# Patient Record
Sex: FEMALE | Race: BLACK OR AFRICAN AMERICAN
[De-identification: names, ages, dates, MRNs, and addresses within clinical notes are randomized per-mention and may not be internally consistent; named-entity substitution may affect disease eponyms.]

---

## 2018-01-10 ENCOUNTER — HOSPITAL ENCOUNTER (OUTPATIENT)
Dept: HOSPITAL 92 - BICMAMMO | Age: 48
Discharge: HOME | End: 2018-01-10
Attending: NURSE PRACTITIONER
Payer: COMMERCIAL

## 2018-01-10 DIAGNOSIS — R92.1: ICD-10-CM

## 2018-01-10 DIAGNOSIS — Z12.31: Primary | ICD-10-CM

## 2018-01-10 PROCEDURE — 77067 SCR MAMMO BI INCL CAD: CPT

## 2018-01-10 PROCEDURE — 77063 BREAST TOMOSYNTHESIS BI: CPT

## 2018-01-22 ENCOUNTER — HOSPITAL ENCOUNTER (OUTPATIENT)
Dept: HOSPITAL 92 - BICMAMMO | Age: 48
Discharge: HOME | End: 2018-01-22
Attending: NURSE PRACTITIONER
Payer: COMMERCIAL

## 2018-01-22 DIAGNOSIS — R92.1: ICD-10-CM

## 2018-01-22 DIAGNOSIS — Z12.31: Primary | ICD-10-CM

## 2018-01-22 PROCEDURE — G0279 TOMOSYNTHESIS, MAMMO: HCPCS

## 2018-02-01 ENCOUNTER — HOSPITAL ENCOUNTER (OUTPATIENT)
Dept: HOSPITAL 92 - MAMMO | Age: 48
End: 2018-02-01
Attending: NURSE PRACTITIONER
Payer: COMMERCIAL

## 2018-02-01 DIAGNOSIS — Z88.5: ICD-10-CM

## 2018-02-01 DIAGNOSIS — N60.81: Primary | ICD-10-CM

## 2018-02-01 DIAGNOSIS — E11.9: ICD-10-CM

## 2018-02-01 DIAGNOSIS — Z98.890: ICD-10-CM

## 2018-02-01 DIAGNOSIS — J45.909: ICD-10-CM

## 2018-02-01 DIAGNOSIS — I10: ICD-10-CM

## 2018-02-01 DIAGNOSIS — E78.5: ICD-10-CM

## 2018-02-01 PROCEDURE — 88305 TISSUE EXAM BY PATHOLOGIST: CPT

## 2018-02-01 PROCEDURE — 0HBT3ZX EXCISION OF RIGHT BREAST, PERCUTANEOUS APPROACH, DIAGNOSTIC: ICD-10-PCS | Performed by: NURSE PRACTITIONER

## 2018-02-01 PROCEDURE — 19081 BX BREAST 1ST LESION STRTCTC: CPT

## 2018-02-01 PROCEDURE — 76098 X-RAY EXAM SURGICAL SPECIMEN: CPT

## 2018-02-01 NOTE — MMO
RIGHT BREAST STEREOTACTIC BIOPSY:

 

Date:  02/01/18

 

INDICATION:

Right breast calcifications with upper inner aspect of the right breast. 

 

COMPARISON:  

Prior diagnostic mammographic evaluation from [facility] dated 01/22/18. 

 

TECHNIQUE:  

Informed consent was obtained. Timeout was performed. The patient was placed prone on the stereotacti
c breast table. The right breast was positioned. The cluster of calcifications within the upper inner
 aspect of the right breast were localized. Site overlying the region was prepped and draped in the u
sual sterile fashion. Buffered 1% lidocaine was administered to the overlying subcutaneous tissues. A
 small incision was made. The stereotactic breast needle was guided into the skin. Pre-fire breast im
ages were performed, demonstrating appropriate position of the needle in relationship to the calcific
ations. The needle was then deployed. Post-fire images demonstrate the needle in the expected locatio
n of the cluster of calcifications within the upper inner right breast. Sampling was performed in a 3
60 degree revolution. Six separate samples were obtained. Following obtaining of the specimen, the sp
ecimen was sent to the mammographic suite to confirm the specimen containing representative calcifica
tions of the suspicious cluster. Upon confirmation that samples of the suspicious cluster were presen
t within the specimen, a clip was deployed. Pressure was held at the biopsy site until hemostasis was
 obtained. The patient was sent to the mammographic suite for a follow-up right breast mammogram to c
onfirm clip placement. 

 

IMPRESSION: 

BIRADS 4:  Suspicious Abnormality 

Status post stereotactic right breast biopsy. Awaiting pathology results. 

 

POS: [facility]

## 2018-02-01 NOTE — MMO
RIGHT BREAST DIAGNOSTIC MAMMOGRAM:

 

Date:  02/01/18 

 

INDICATION:

Post clip diagnostic mammograms. 

 

COMPARISON:  

Right breast diagnostic evaluation from [facility] dated 01/22/17. 

 

FINDINGS:

There is resection cavity seen within the upper inner aspect of the right breast corresponding to the
 region of suspicious cluster of calcifications identified on diagnostic evaluation dated 01/22/18. B
iopsy clip is seen within this region. 

 

IMPRESSION: 

BIRADS 4:  Suspicious Abnormality 

Status post stereotactic biopsy of suspicious cluster of calcifications within the upper inner aspect
 of the right breast. A majority of the calcifications have been sampled. 

 

 

 

POS: [facility]

## 2018-02-01 NOTE — MMO
RIGHT BREAST STEREOTACTIC BREAST BIOPSY SURGICAL SPECIMEN RADIOGRAPH;

 

Date:  02/01/18 

 

INDICATION: 

Suspicious calcifications within the right breast upper inner quadrant. 

 

FINDINGS:

The submitted specimens do demonstrate representative calcifications of the suspicious cluster seen i
n the upper inner aspect of the right breast on the diagnostic evaluation from [facility]
ter dated 01/22/18. 

 

IMPRESSION: 

BIRADS 4:  Suspicious Abnormality

Status post stereotactic breast biopsy for suspicious cluster of calcifications within the upper inne
r aspect of the right breast. The submitted specimens do contain representative calcifications of the
 suspicious cluster. 

 

 

 

POS: [facility]

## 2018-03-02 ENCOUNTER — HOSPITAL ENCOUNTER (OUTPATIENT)
Dept: HOSPITAL 92 - LABBT | Age: 48
Discharge: HOME | End: 2018-03-02
Attending: SURGERY
Payer: COMMERCIAL

## 2018-03-02 DIAGNOSIS — Z01.818: Primary | ICD-10-CM

## 2018-03-02 DIAGNOSIS — N60.91: ICD-10-CM

## 2018-03-02 LAB
ALBUMIN SERPL BCG-MCNC: 3.8 G/DL (ref 3.5–5)
ALP SERPL-CCNC: 62 U/L (ref 40–150)
ALT SERPL W P-5'-P-CCNC: 20 U/L (ref 8–55)
ANION GAP SERPL CALC-SCNC: 9 MMOL/L (ref 10–20)
AST SERPL-CCNC: 16 U/L (ref 5–34)
BASOPHILS # BLD AUTO: 0 THOU/UL (ref 0–0.2)
BASOPHILS NFR BLD AUTO: 0.4 % (ref 0–1)
BILIRUB SERPL-MCNC: 0.4 MG/DL (ref 0.2–1.2)
BUN SERPL-MCNC: 8 MG/DL (ref 7–18.7)
CALCIUM SERPL-MCNC: 9.2 MG/DL (ref 7.8–10.44)
CHLORIDE SERPL-SCNC: 107 MMOL/L (ref 98–107)
CO2 SERPL-SCNC: 26 MMOL/L (ref 22–29)
CREAT CL PREDICTED SERPL C-G-VRATE: 0 ML/MIN (ref 70–130)
EOSINOPHIL # BLD AUTO: 0.3 THOU/UL (ref 0–0.7)
EOSINOPHIL NFR BLD AUTO: 3.8 % (ref 0–10)
GLOBULIN SER CALC-MCNC: 3.3 G/DL (ref 2.4–3.5)
GLUCOSE SERPL-MCNC: 118 MG/DL (ref 70–105)
HGB BLD-MCNC: 11.1 G/DL (ref 12–16)
LYMPHOCYTES # BLD: 3.4 THOU/UL (ref 1.2–3.4)
LYMPHOCYTES NFR BLD AUTO: 45 % (ref 21–51)
MCH RBC QN AUTO: 22.6 PG (ref 27–31)
MCV RBC AUTO: 70 FL (ref 81–99)
MDIFF COMPLETE?: YES
MICROCYTES BLD QL SMEAR: (no result) (100X)
MONOCYTES # BLD AUTO: 0.5 THOU/UL (ref 0.11–0.59)
MONOCYTES NFR BLD AUTO: 7 % (ref 0–10)
NEUTROPHILS # BLD AUTO: 3.3 THOU/UL (ref 1.4–6.5)
NEUTROPHILS NFR BLD AUTO: 43.9 % (ref 42–75)
PLATELET # BLD AUTO: 267 THOU/UL (ref 130–400)
PLATELET BLD QL SMEAR: (no result)
POTASSIUM SERPL-SCNC: 3.7 MMOL/L (ref 3.5–5.1)
RBC # BLD AUTO: 4.91 MILL/UL (ref 4.2–5.4)
SODIUM SERPL-SCNC: 138 MMOL/L (ref 136–145)
WBC # BLD AUTO: 7.4 THOU/UL (ref 4.8–10.8)

## 2018-03-02 PROCEDURE — 93005 ELECTROCARDIOGRAM TRACING: CPT

## 2018-03-02 PROCEDURE — 85025 COMPLETE CBC W/AUTO DIFF WBC: CPT

## 2018-03-02 PROCEDURE — 93010 ELECTROCARDIOGRAM REPORT: CPT

## 2018-03-02 PROCEDURE — 80053 COMPREHEN METABOLIC PANEL: CPT

## 2018-03-07 ENCOUNTER — HOSPITAL ENCOUNTER (OUTPATIENT)
Dept: HOSPITAL 92 - SDC | Age: 48
Discharge: HOME | End: 2018-03-07
Attending: SURGERY
Payer: COMMERCIAL

## 2018-03-07 VITALS — BODY MASS INDEX: 31.6 KG/M2

## 2018-03-07 DIAGNOSIS — D24.1: ICD-10-CM

## 2018-03-07 DIAGNOSIS — N60.91: Primary | ICD-10-CM

## 2018-03-07 DIAGNOSIS — I10: ICD-10-CM

## 2018-03-07 DIAGNOSIS — J45.909: ICD-10-CM

## 2018-03-07 DIAGNOSIS — Z87.891: ICD-10-CM

## 2018-03-07 DIAGNOSIS — E66.3: ICD-10-CM

## 2018-03-07 DIAGNOSIS — Z79.899: ICD-10-CM

## 2018-03-07 DIAGNOSIS — Z88.5: ICD-10-CM

## 2018-03-07 DIAGNOSIS — G43.909: ICD-10-CM

## 2018-03-07 DIAGNOSIS — G40.909: ICD-10-CM

## 2018-03-07 DIAGNOSIS — E78.00: ICD-10-CM

## 2018-03-07 DIAGNOSIS — E78.5: ICD-10-CM

## 2018-03-07 DIAGNOSIS — Z79.82: ICD-10-CM

## 2018-03-07 PROCEDURE — 19281 PERQ DEVICE BREAST 1ST IMAG: CPT

## 2018-03-07 PROCEDURE — 88307 TISSUE EXAM BY PATHOLOGIST: CPT

## 2018-03-07 PROCEDURE — 76098 X-RAY EXAM SURGICAL SPECIMEN: CPT

## 2018-03-07 PROCEDURE — 96375 TX/PRO/DX INJ NEW DRUG ADDON: CPT

## 2018-03-07 PROCEDURE — 0HBT0ZX EXCISION OF RIGHT BREAST, OPEN APPROACH, DIAGNOSTIC: ICD-10-PCS | Performed by: SURGERY

## 2018-03-07 PROCEDURE — 96374 THER/PROPH/DIAG INJ IV PUSH: CPT

## 2018-03-07 NOTE — MMO
RIGHT BREAST NEEDLE LOCALIZATION:

 

HISTORY: 

Status post right breast stereotactic biopsy.  Needle localization is requested for surgical guidance
.

 

COMPARISON: 

2/1/18.

 

FINDINGS: 

Technically successful right breast needle localization.  A 7 cm Cougar needle wire adjacent to the bi
opsy clip.  Postprocedure images demonstrate appropriate positioning of the needle.  Wire has been ap
propriately deployed.

 

TECHNIQUE: 

Consent was obtained for a right breast needle localizaiton for surgical guidance.  The right breast 
was compressed in the mediolateral direction.  The skin was prepped and draped in sterile fashion.  1
% Lidocaine, buffered with sodium bicarbonate was used for local anesthesia.  Under mammographic guid
ance, a 7 cm Cougar needle was advanced such that the needle was adjacent to the clip.  Needle positio
n was confirmed in orhtopgonal planes.  Wire was deployed.  Post-deployment image was obtained.  The 
patient tolerated the procedure well.  No immediate or postprocedure complications.  Films were marke
d.  Needle and wire secured to the patient.

 

SPECIMEN RADIOGRAPH:

Three separate specimens were obtained.  The first specimen demonstrates a Cougar wire.  The clip is n
ot present.  The clip is not present on the remaining 2 specimens.

 

Findings of each specimen were conveyed to Dr. Herrera upon preentation of the specimen.

 

IMPRESSION: 

1.  Successful left breast needle localization.

 

2.  Wire is present in the specimen; however, the clip is not appreciated.

 

POS: [facility]

## 2018-03-07 NOTE — OP
PREOPERATIVE DIAGNOSIS:  Atypical ductal hyperplasia of the right breast.

 

SURGEON:  Delonte Herrera M.D.

 

PROCEDURE PERFORMED:  Right needle localization breast biopsy.

 

INDICATIONS:  This is a 48-year-old female who has had new cluster microcalcifications.  She underwen
t a core needle biopsy that came back atypical ductal hyperplasia.

 

FINDINGS:  The needle tip was placed far away from where the clip was and initial biopsy specimen rev
ealed the needle, but no clip.  Two other samples were obtained.  Never got the clip.  Attempted ultr
asound of the area could not find the clip.  Rather than doing a mastectomy, elected to abort at this
 time.

 

PROCEDURE IN DETAIL:  After informed consent was obtained, the patient was taken to the operating pradip
m.  She has undergone placement of a localization needle in mammography.  Her right breast was preppe
d and draped in usual fashion.  Local anesthesia infiltrated subcutaneously and deep after her breast
 was prepped and draped.  A circumareolar incision was performed.  The needle tract was palpable and 
a core of breast tissue was excised around the needle.  The skin was  from the breast tissue
.  The needle grasped and brought through the skin and then the rest of the needle tract was excised.
  The specimen was marked with anteromedial with the needle.  A blue suture superior and a white sutu
re lateral and sent to mammography.  Radiologist called back and said do not see the clip.  So, I exc
ised a larger portion of tissue all the way down to the pectoralis fascia along this needle tract and
 more medially marked it again with black anterior, blue superior, white medial.  This was again sent
.  No clip seen.  So, I took wider margins of that area trying to find the clip.  Also, I ultrasounde
d the breast.  I could not find the clip with intraoperative ultrasound.  I ultrasounded the tissue t
hat we removed did not see the clip rather than severely distorted her breast, we elected to abort th
e procedure.  Hemostasis achieved with electrocautery.  The cavity was irrigated with saline.  The salas
bcutaneous reapproximated with interrupted 3-0 Vicryl.  The skin closed with a running subcuticular 4
-0 Rapide.  Steri-Strips applied.  Sterile bandage applied.  The patient tolerated the procedure well
 and was transferred to recovery in good condition.  Sponge and needle count verified correct x2.

## 2018-08-30 ENCOUNTER — HOSPITAL ENCOUNTER (OUTPATIENT)
Dept: HOSPITAL 92 - ERS | Age: 48
Setting detail: OBSERVATION
LOS: 1 days | Discharge: HOME | End: 2018-08-31
Attending: HOSPITALIST | Admitting: HOSPITALIST
Payer: COMMERCIAL

## 2018-08-30 VITALS — BODY MASS INDEX: 30.6 KG/M2

## 2018-08-30 DIAGNOSIS — E78.00: ICD-10-CM

## 2018-08-30 DIAGNOSIS — Z88.5: ICD-10-CM

## 2018-08-30 DIAGNOSIS — E87.1: ICD-10-CM

## 2018-08-30 DIAGNOSIS — Z79.82: ICD-10-CM

## 2018-08-30 DIAGNOSIS — J45.20: ICD-10-CM

## 2018-08-30 DIAGNOSIS — F41.9: ICD-10-CM

## 2018-08-30 DIAGNOSIS — D50.9: ICD-10-CM

## 2018-08-30 DIAGNOSIS — E78.5: ICD-10-CM

## 2018-08-30 DIAGNOSIS — Z79.899: ICD-10-CM

## 2018-08-30 DIAGNOSIS — R03.0: ICD-10-CM

## 2018-08-30 DIAGNOSIS — E66.9: ICD-10-CM

## 2018-08-30 DIAGNOSIS — R73.03: ICD-10-CM

## 2018-08-30 DIAGNOSIS — Z77.22: ICD-10-CM

## 2018-08-30 DIAGNOSIS — R55: Primary | ICD-10-CM

## 2018-08-30 LAB
ALBUMIN SERPL BCG-MCNC: 4 G/DL (ref 3.5–5)
ALP SERPL-CCNC: 70 U/L (ref 40–150)
ALT SERPL W P-5'-P-CCNC: 15 U/L (ref 8–55)
ANION GAP SERPL CALC-SCNC: 12 MMOL/L (ref 10–20)
AST SERPL-CCNC: 15 U/L (ref 5–34)
BILIRUB SERPL-MCNC: 0.7 MG/DL (ref 0.2–1.2)
BUN SERPL-MCNC: 10 MG/DL (ref 7–18.7)
CALCIUM SERPL-MCNC: 9 MG/DL (ref 7.8–10.44)
CHLORIDE SERPL-SCNC: 105 MMOL/L (ref 98–107)
CK MB SERPL-MCNC: 1.6 NG/ML (ref 0–6.6)
CK SERPL-CCNC: 195 U/L (ref 29–168)
CO2 SERPL-SCNC: 22 MMOL/L (ref 22–29)
CREAT CL PREDICTED SERPL C-G-VRATE: 0 ML/MIN (ref 70–130)
CRYSTAL-AUWI FLAG: 0 (ref 0–15)
GLOBULIN SER CALC-MCNC: 3.7 G/DL (ref 2.4–3.5)
GLUCOSE SERPL-MCNC: 95 MG/DL (ref 70–105)
HEV IGM SER QL: 0.1 (ref 0–7.99)
HGB BLD-MCNC: 11.4 G/DL (ref 12–16)
HYALINE CASTS #/AREA URNS LPF: (no result) LPF
LIPASE SERPL-CCNC: 31 U/L (ref 8–78)
MCH RBC QN AUTO: 22.3 PG (ref 27–31)
MCV RBC AUTO: 68 FL (ref 78–98)
MDIFF COMPLETE?: YES
MICROCYTES BLD QL SMEAR: (no result) (100X)
PATHC CAST-AUWI FLAG: 0 (ref 0–2.49)
PLATELET # BLD AUTO: 252 THOU/UL (ref 130–400)
POTASSIUM SERPL-SCNC: 3.9 MMOL/L (ref 3.5–5.1)
PREGS CONTROL BACKGROUND?: (no result)
PREGS CONTROL BAR APPEAR?: YES
RBC # BLD AUTO: 5.09 MILL/UL (ref 4.2–5.4)
RBC UR QL AUTO: (no result) HPF (ref 0–3)
SODIUM SERPL-SCNC: 135 MMOL/L (ref 136–145)
SP GR UR STRIP: 1.01 (ref 1–1.04)
SPERM-AUWI FLAG: 0 (ref 0–9.9)
TROPONIN I SERPL DL<=0.01 NG/ML-MCNC: (no result) NG/ML (ref ?–0.03)
WBC # BLD AUTO: 8.8 THOU/UL (ref 4.8–10.8)
WBC UR QL AUTO: (no result) HPF (ref 0–3)
YEAST-AUWI FLAG: 0 (ref 0–25)

## 2018-08-30 PROCEDURE — 84484 ASSAY OF TROPONIN QUANT: CPT

## 2018-08-30 PROCEDURE — 96375 TX/PRO/DX INJ NEW DRUG ADDON: CPT

## 2018-08-30 PROCEDURE — 85025 COMPLETE CBC W/AUTO DIFF WBC: CPT

## 2018-08-30 PROCEDURE — 71045 X-RAY EXAM CHEST 1 VIEW: CPT

## 2018-08-30 PROCEDURE — 83880 ASSAY OF NATRIURETIC PEPTIDE: CPT

## 2018-08-30 PROCEDURE — 96372 THER/PROPH/DIAG INJ SC/IM: CPT

## 2018-08-30 PROCEDURE — 36415 COLL VENOUS BLD VENIPUNCTURE: CPT

## 2018-08-30 PROCEDURE — 82553 CREATINE MB FRACTION: CPT

## 2018-08-30 PROCEDURE — 81003 URINALYSIS AUTO W/O SCOPE: CPT

## 2018-08-30 PROCEDURE — 81015 MICROSCOPIC EXAM OF URINE: CPT

## 2018-08-30 PROCEDURE — 93880 EXTRACRANIAL BILAT STUDY: CPT

## 2018-08-30 PROCEDURE — 70450 CT HEAD/BRAIN W/O DYE: CPT

## 2018-08-30 PROCEDURE — G0378 HOSPITAL OBSERVATION PER HR: HCPCS

## 2018-08-30 PROCEDURE — 83690 ASSAY OF LIPASE: CPT

## 2018-08-30 PROCEDURE — 96365 THER/PROPH/DIAG IV INF INIT: CPT

## 2018-08-30 PROCEDURE — 80048 BASIC METABOLIC PNL TOTAL CA: CPT

## 2018-08-30 PROCEDURE — 84703 CHORIONIC GONADOTROPIN ASSAY: CPT

## 2018-08-30 PROCEDURE — 93306 TTE W/DOPPLER COMPLETE: CPT

## 2018-08-30 PROCEDURE — 93005 ELECTROCARDIOGRAM TRACING: CPT

## 2018-08-30 PROCEDURE — 80053 COMPREHEN METABOLIC PANEL: CPT

## 2018-08-30 NOTE — RAD
CHEST ONE VIEW:

8/30/18

 

HISTORY: 

Chest pain. 

 

COMPARISON:  

9/28/17.

 

FINDINGS:  

The cardiac silhouette is magnified by projection. Pulmonary vasculature is slightly engorged. Medias
tinum is midline. No lobar consolidation or evidence of pneumothorax. Cardiac monitor leads overlie t
he chest. 

 

IMPRESSION:  

Mild pulmonary vascular congestion. 

 

POS: [facility]

## 2018-08-30 NOTE — CT
CT HEAD NONCONTRAST:

8/30/18

 

HISTORY: 

Seizure.

 

COMPARISON:  

8/27/15.

 

FINDINGS:  

There is no evidence of acute intracranial hemorrhage or infarct. Ventricles appear normal in size, s
hape and position. There is no mass effect or shift of midline structures. 

 

IMPRESSION:  

No acute intracranial abnormalities are demonstrated. 

 

POS: [facility]

## 2018-08-31 VITALS — SYSTOLIC BLOOD PRESSURE: 101 MMHG | DIASTOLIC BLOOD PRESSURE: 51 MMHG | TEMPERATURE: 98.9 F

## 2018-08-31 LAB
ANION GAP SERPL CALC-SCNC: 10 MMOL/L (ref 10–20)
BASOPHILS # BLD AUTO: 0 THOU/UL (ref 0–0.2)
BASOPHILS NFR BLD AUTO: 0.1 % (ref 0–1)
BUN SERPL-MCNC: 8 MG/DL (ref 7–18.7)
CALCIUM SERPL-MCNC: 8.4 MG/DL (ref 7.8–10.44)
CHLORIDE SERPL-SCNC: 110 MMOL/L (ref 98–107)
CO2 SERPL-SCNC: 20 MMOL/L (ref 22–29)
CREAT CL PREDICTED SERPL C-G-VRATE: 115 ML/MIN (ref 70–130)
EOSINOPHIL # BLD AUTO: 0.3 THOU/UL (ref 0–0.7)
EOSINOPHIL NFR BLD AUTO: 4.9 % (ref 0–10)
GLUCOSE SERPL-MCNC: 101 MG/DL (ref 70–105)
HGB BLD-MCNC: 10.3 G/DL (ref 12–16)
LYMPHOCYTES # BLD: 1.3 THOU/UL (ref 1.2–3.4)
LYMPHOCYTES NFR BLD AUTO: 18.2 % (ref 21–51)
MCH RBC QN AUTO: 22.5 PG (ref 27–31)
MCV RBC AUTO: 68.4 FL (ref 78–98)
MONOCYTES # BLD AUTO: 0.5 THOU/UL (ref 0.11–0.59)
MONOCYTES NFR BLD AUTO: 7.6 % (ref 0–10)
NEUTROPHILS # BLD AUTO: 5 THOU/UL (ref 1.4–6.5)
NEUTROPHILS NFR BLD AUTO: 69.2 % (ref 42–75)
PLATELET # BLD AUTO: 228 THOU/UL (ref 130–400)
POTASSIUM SERPL-SCNC: 3.9 MMOL/L (ref 3.5–5.1)
RBC # BLD AUTO: 4.57 MILL/UL (ref 4.2–5.4)
SODIUM SERPL-SCNC: 136 MMOL/L (ref 136–145)
TROPONIN I SERPL DL<=0.01 NG/ML-MCNC: (no result) NG/ML (ref ?–0.03)
TROPONIN I SERPL DL<=0.01 NG/ML-MCNC: (no result) NG/ML (ref ?–0.03)
WBC # BLD AUTO: 7.2 THOU/UL (ref 4.8–10.8)

## 2018-08-31 NOTE — HP
PRIMARY CARE PHYSICIAN:  Kailey Montaño NP

 

CODE STATUS:  FULL CODE.

 

TIME OF EVALUATION:  11:30 p.m.

 

CHIEF COMPLAINT:  Syncope.

 

HISTORY OF PRESENT ILLNESS:  This is a 48-year-old female patient with past medical history of high c
holesterol; borderline hypertension, has been off medication since her blood pressure medications wer
e dropping her blood pressure too much; also borderline diabetes, no specific treatment.  She came to
 the hospital after having an episode of syncope when she was starting to drive in her driveway.  The
 symptom started suddenly, lasted for a few seconds.  No residual neurological damage was seen after 
the patient woke up.  The patient's mentation was proper after she woke up.  No clear triggers, impro
keisha by itself.  Symptoms were not severe.  Witness reported that she had no shaking and no seizure-li
ke activity.

 

REVIEW OF SYSTEMS:  Constitutional:  No fever, chills, or generalized weakness.  Respiratory:  No cou
gh, sputum production, or shortness of breath.  Cardiovascular:  No chest pain, palpitation, shortnes
s of breath.  Gastrointestinal:  No nausea.  No vomiting, diarrhea, or abdominal pain.  Central nervo
us system:  The patient had a syncopal episode with no prodromic symptoms.  No headache or feeling li
ghtheaded.  Genitourinary:  No burning on urination.  Extremities:  No leg swelling.  All other syste
ms were reviewed and negative except for the findings mentioned above.

 

PAST MEDICAL HISTORY:  As mentioned in HPI.

 

PAST SURGICAL HISTORY:  The patient had a negative cardiac catheterization in 2011.

 

PAST SURGICAL HISTORY:  Tubal ligation, abnormal cells removed from right breast.  She reported that 
she had no cancer.

 

PSYCHIATRIC HISTORY:  Anxiety.

 

SOCIAL HISTORY:  The patient lives with family.  Secondhand smoke from her .  Drinks socially 
occasionally.  No drug use.  No smoking history.  The patient drinks every day.

 

ALLERGIES:  CODEINE.

 

MEDICATIONS:  Aspirin, atorvastatin.

 

PHYSICAL EXAMINATION:

VITAL SIGNS:  On presentation, blood pressure 149/79 with heart rate 71, respiratory rate was 18, tem
perature 99.1.  Pain was 8/10 with a headache.

GENERAL APPEARANCE:  The patient was met at bedside.  She was feeling comfortable, no acute distress.


HEENT:  Eyes, normal conjunctivae.  Moist oral mucosa.  Anicteric.

NECK:  No JVD.

RESPIRATORY:  Bilateral air entry.  No rales, no wheezing.  Symmetric expansion.

CARDIOVASCULAR:  Normal rate, regular rhythm.  No murmurs, no gallop, no edema.

ABDOMEN:  Soft.  Normal bowel sounds.

MUSCULOSKELETAL:  Baseline range of motion and strength.  No tenderness.

SKIN:  Warm and intact.  No pallor, no rash, no redness.  Peripheral pulses are present.  Capillary r
efill seems to be intact.

NEUROLOGIC:  Baseline sensorium.  No evidence of any new focal weakness.  Baseline speech.  Cranial n
erves seem to be intact.

PSYCHIATRIC:  The patient is in good mood.  No anxiety.  Oriented with optimal judgment.

 

EKG was reviewed.  The patient has normal sinus rhythm at a rate of 71 with , QRS 78, QT correc
keyona 445.  Chest x-ray:  Mild pulmonary vascular congestion.  Brain CT:  No acute intracranial abnorma
lities are demonstrated.

 

LABORATORY DATA:  Reviewed.  White count 8.8, hemoglobin 11.4, platelet count 252.  Chemistry:  Sodiu
m 135, potassium 3.9, chloride 105, carbon dioxide 22, anion gap 12, BUN 10, creatinine 0.79, GFR gre
ater than 90, glucose 95, calcium 9, total bilirubin 0.7, AST 15, ALT 15, alkaline phosphatase 70, CK
 195.  Troponin is less than 0.010.  Beta-natriuretic peptide 11.8.  Serum total protein 7.7, albumin
 4, globulin 3.7, albumin globulin ratio is 1.1, lipase 31.  Serum pregnancy test was negative.  UA w
as negative except for some hematuria seen in urine.

 

ASSESSMENT AND PLAN:  The patient will be placed in the hospital with following medical problems:

1.  Syncope, unclear etiology, by history seems to be cardiac related.  No neuro findings have been r
eported.  We will do echo.  We will monitor on tele.  We will do carotid Doppler.  Further management
 after workup results.

2.  Hyponatremia, sodium 135.  This is mild, no need for any acute intervention.  We will monitor.  W
e will adjust treatment as needed.

3.  Microcytic anemia.  Hemoglobin 11.4.  This is chronic.  This can be addressed as outpatient.  The
re is some possible hematuria on the urine.  The patient has no urinary symptoms.  It can be monitore
d.  Kidney function is normal.  CK is only 195.

4.  Deep venous thrombosis prophylaxis.

5.  Possible pulmonary congestion seen on chest x-ray.  We will be doing echo in the morning.  We ananth
l evaluate cardiac function by echo result.

6.  Hyperlipidemia.  Reconcile home medications.  Low-cholesterol diet is advised.

## 2018-08-31 NOTE — ULT
BILATERAL CAROTID DUPLEX ULTRASOUND:

 

DATE:  08/31/18 

 

HISTORY:  

Syncope. 

 

TECHNIQUE:  

Gray scale ultrasound with color flow and spectral Doppler imaging of the extracranial carotid artery
 systems performed. 

 

FINDINGS:

 

No plaque formation or significant intimal wall thickening is seen on either side. 

 

The peak systolic velocity in the right ICA measures 109 cm/second with an end-diastolic velocity of 
29 cm/second and a systolic ratio of 1.10. 

 

The peak systolic velocity in the left ICA measures 111 cm/second with an end-diastolic velocity of 3
3 cm/second and a systolic ratio of 0.90. 

 

Flow in both vertebral arteries remains antegrade. 

 

IMPRESSION: 

No evidence of hemodynamically significant stenosis.  

 

POS: [facility]

## 2018-08-31 NOTE — PDOC.PN
- Subjective


Encounter Start Date: 08/31/18


Encounter Start Time: 08:30


-: old records requested/rev





Patient seen and examined. No new complaints. No overnight events





- Objective


Resuscitation Status: 


 











Resuscitation Status           FULL:Full Resuscitation














MAR Reviewed: Yes


Vital Signs & Weight: 


 Vital Signs (12 hours)











  Temp Pulse Resp BP BP BP BP


 


 08/31/18 07:33  98.5 F  68  16  112/72   


 


 08/31/18 03:28  99 F  69  14  98/54 L   


 


 08/31/18 01:40  99.3 F  69  14    


 


 08/31/18 01:35      142/76 H  136/79  127/75


 


 08/31/18 00:50  99.3 F  69  14  123/72   














  Pulse Ox


 


 08/31/18 07:33  94 L


 


 08/31/18 03:28  95


 


 08/31/18 01:40 


 


 08/31/18 01:35 


 


 08/31/18 00:50  97








 Weight











Weight                         167 lb 8 oz














I&O: 


 











 08/30/18 08/31/18 09/01/18





 06:59 06:59 06:59


 


Intake Total  50 


 


Output Total  600 400


 


Balance  -550 -400











Result Diagrams: 


 08/31/18 03:23





 08/31/18 03:23


Radiology Reviewed by me: Yes


EKG Reviewed by me: Yes





Phys Exam





- Physical Examination


Constitutional: NAD


HEENT: PERRLA, moist MMs, sclera anicteric


Neck: no JVD, supple


Respiratory: no wheezing, no rales, no rhonchi


Cardiovascular: RRR, no significant murmur, no rub


Gastrointestinal: soft, non-tender, no distention, positive bowel sounds


Musculoskeletal: no edema, pulses present


Neurological: non-focal, normal sensation, moves all 4 limbs


Psychiatric: normal affect, A&O x 3


Skin: no rash, normal turgor





Dx/Plan


(1) Syncope


Code(s): R55 - SYNCOPE AND COLLAPSE   Status: Acute   





(2) Asthma


Code(s): J45.909 - UNSPECIFIED ASTHMA, UNCOMPLICATED   Status: Chronic   





(3) Dyslipidemia


Code(s): E78.5 - HYPERLIPIDEMIA, UNSPECIFIED   Status: Chronic   





(4) Microcytic anemia


Code(s): D50.9 - IRON DEFICIENCY ANEMIA, UNSPECIFIED   Status: Chronic   





(5) Obesity (BMI 30.0-34.9)


Code(s): E66.9 - OBESITY, UNSPECIFIED   Status: Chronic   





- Plan


cont current plan of care, plan discussed w/ family





* medication reviewed as below


* symptomatic treatment


* see my discharge sera.








Review of Systems





- Review of Systems


ENT: negative: Ear Pain, Ear Discharge, Nose Pain, Nose Discharge, Nose 

Congestion, Mouth Pain, Mouth Swelling, Throat Pain, Throat Swelling, Other


Respiratory: negative: Cough, Dry, Shortness of Breath, Hemoptysis, SOB with 

Excertion, Pleuritic Pain, Sputum, Wheezing


Cardiovascular: negative: chest pain, palpitations, orthopnea, paroxysmal 

nocturnal dyspnea, edema, light headedness, other


Gastrointestinal: negative: Nausea, Vomiting, Abdominal Pain, Diarrhea, 

Constipation, Melena, Hematochezia, Other


Genitourinary: negative: Dysuria, Frequency, Incontinence, Hematuria, Retention

, Other


Musculoskeletal: negative: Neck Pain, Shoulder Pain, Arm Pain, Back Pain, Hand 

Pain, Leg Pain, Foot Pain, Other


Skin: negative: Rash, Lesions, Edgar, Bruising, Other





- Medications/Allergies


Allergies/Adverse Reactions: 


 Allergies











Allergy/AdvReac Type Severity Reaction Status Date / Time


 


codeine Allergy   Verified 08/31/18 01:13











Medications: 


 Current Medications





Acetaminophen (Tylenol)  650 mg PO Q4H PRN


   PRN Reason: Headache/Fever or Pain


   Last Admin: 08/31/18 09:05 Dose:  650 mg


Al Hydroxide/Mg Hydroxide (Maalox)  15 ml PO Q4H PRN


   PRN Reason: Heartburn  or Indigestion


Artificial Tears (Tears Renewed 15ml Bottle)  0 drop EA EYE PRN PRN


   PRN Reason: Dry Eyes


Aspirin (Aspirin Chewable)  81 mg PO [facility]


   Last Admin: 08/31/18 09:05 Dose:  81 mg


Atorvastatin Calcium (Lipitor)  20 mg PO [facility]


Famotidine (Pepcid)  20 mg PO BIDPRN PRN


   PRN Reason: Heartburn  or Indigestion


Guaifenesin (Robitussin Sf)  200 mg PO Q4H PRN


   PRN Reason: Cough


Hydralazine HCl (Apresoline)  10 mg SLOW IVP Q4H PRN


   PRN Reason: Systolic BP > 180


Loperamide HCl (Imodium)  2 mg PO PRN PRN


   PRN Reason: Diarrhea/Loose Stools


Loratadine (Claritin)  10 mg PO DAILYPRN PRN


   PRN Reason: Sinus Symptoms


Magnesium Hydroxide (Milk Of Magnesium)  30 ml PO DAILYPRN PRN


   PRN Reason: Constipation


Mineral Oil/White Petrolatum (Eucerin Cream)  0 gm TOP BIDPRN PRN


   PRN Reason: Dry Skin


Ondansetron HCl (Zofran)  4 mg IVP Q6H PRN


   PRN Reason: Nausea/Vomiting


Phenol (Chloraseptic Spray 180 Ml Bot)  0 ml PO PRN PRN


   PRN Reason: Sore Throat


Senna (Senokot)  2 tab PO HSPRN PRN


   PRN Reason: Constipation


Sodium Chloride (Ocean Nasal Spray 0.65%)  0 ml EA NARE QIDPRN PRN


   PRN Reason: Nasal Congestion


Zolpidem Tartrate (Ambien)  5 mg PO HSPRN PRN


   PRN Reason: Insomnia

## 2019-02-14 ENCOUNTER — HOSPITAL ENCOUNTER (OUTPATIENT)
Dept: HOSPITAL 92 - BICMAMMO | Age: 49
Discharge: HOME | End: 2019-02-14
Attending: SURGERY
Payer: COMMERCIAL

## 2019-02-14 DIAGNOSIS — N62: ICD-10-CM

## 2019-02-14 DIAGNOSIS — N64.4: Primary | ICD-10-CM

## 2019-02-14 DIAGNOSIS — N63.10: ICD-10-CM

## 2019-02-14 DIAGNOSIS — Z80.3: ICD-10-CM

## 2019-02-14 PROCEDURE — 77066 DX MAMMO INCL CAD BI: CPT

## 2019-02-14 PROCEDURE — G0279 TOMOSYNTHESIS, MAMMO: HCPCS

## 2019-02-14 NOTE — ULT
RIGHT BREAST ULTRASOUND:

 

History: Palpable mass in the upper outer aspect of the right breast and burning pain. The patient ha
d a previous right breast resection for atypical ductal hyperplasia. 

 

Comparison: Mammograms 2-14-19, 3-7-18, 2-1-18

 

Technique: Multiplanar grayscale and color doppler images were obtained in a targeted ultrasound of t
he upper outer aspect of the right breast. 

 

FINDINGS:

An anechoic cyst is seen at the 10 o'clock position of the right breast. This may or may not represen
t the palpable abnormality. No suspicious mass or shadowing is seen in the right breast. 

 

IMPRESSION: 

BIRADS category 2 - benign findings. Annual screening mammography is recommended.  

 

 

POS: [facility]

## 2019-03-24 ENCOUNTER — HOSPITAL ENCOUNTER (EMERGENCY)
Dept: HOSPITAL 92 - ERS | Age: 49
LOS: 1 days | Discharge: HOME | End: 2019-03-25
Payer: COMMERCIAL

## 2019-03-24 DIAGNOSIS — J32.9: ICD-10-CM

## 2019-03-24 DIAGNOSIS — E78.5: ICD-10-CM

## 2019-03-24 DIAGNOSIS — G43.909: Primary | ICD-10-CM

## 2019-03-24 DIAGNOSIS — I10: ICD-10-CM

## 2019-03-24 DIAGNOSIS — Z79.899: ICD-10-CM

## 2019-03-24 PROCEDURE — 96365 THER/PROPH/DIAG IV INF INIT: CPT

## 2019-03-24 PROCEDURE — 70450 CT HEAD/BRAIN W/O DYE: CPT

## 2019-03-24 PROCEDURE — 96375 TX/PRO/DX INJ NEW DRUG ADDON: CPT

## 2019-03-25 NOTE — CT
PRELIMINARY REPORT/[facility]/EMERGENCY AFTER

HOURS PROCEDURE: 

 

EXAM:

CT Head Without Contrast

 

EXAM DATE/TIME:

3/25/2019 3:02 AM

 

CLINICAL HISTORY:

49 years old, female; Pain; Headache; Patient HX: PT reports at 2145 was at work and began to have so
me confusion. She reports bad headache since yesterday morning. She reports episodes like this in the
 past due to not having enough rest. She reports another episode of confusion. PT reports

sensitivity to light. PT denies nausea or vomiting 

 

TECHNIQUE:

Imaging protocol: Axial computed tomography images of the head/brain without contrast.

 

COMPARISON:

No relevant prior studies available.

 

FINDINGS:

Brain: No brain edema. No intracranial hemorrhage. Bifrontal atrophy.

Ventricles: Normal. No ventriculomegaly.

Bones/joints: Unremarkable. No acute fracture.

Sinuses: Near-complete opacification of the right sphenoid sinus may signify sinusitis.

Mastoid air cells: Visualized mastoid air cells are unremarkable. No mastoid effusion.

Orbits: Disconjugate gaze.

Soft tissues: Unremarkable.

 

IMPRESSION:

1. No acute brain findings.

 

2. Near-complete opacification of the right sphenoid sinus may signify sinusitis.

 

3. Bifrontal atrophy.

 

Thank you for allowing us to participate in the care of your patient.

Dictated and Authenticated by: Phil Hernandes MD

03/25/2019 3:18 AM Central Time ([place] & [place])

 

 

FINAL REPORT 

EMERGENCY AFTER HOURS BRAIN CT WITHOUT IV CONTRAST:

 

Date:  03/25/19 

Time:  0304 hours

 

FINDINGS/IMPRESSION: 

No significant acute process. Right sphenoid sinus mucosal disease. No significant change from 08/03/
18. 

 

 

 

POS: [facility]

## 2020-04-09 ENCOUNTER — HOSPITAL ENCOUNTER (OUTPATIENT)
Dept: HOSPITAL 92 - BICRAD | Age: 50
Discharge: HOME | End: 2020-04-09
Attending: PHYSICIAN ASSISTANT
Payer: COMMERCIAL

## 2020-04-09 DIAGNOSIS — R06.02: Primary | ICD-10-CM

## 2020-04-09 DIAGNOSIS — R91.8: ICD-10-CM

## 2020-04-09 PROCEDURE — 84484 ASSAY OF TROPONIN QUANT: CPT

## 2020-04-09 PROCEDURE — 82553 CREATINE MB FRACTION: CPT

## 2020-04-09 PROCEDURE — 83880 ASSAY OF NATRIURETIC PEPTIDE: CPT

## 2020-04-09 PROCEDURE — 85379 FIBRIN DEGRADATION QUANT: CPT

## 2020-04-09 PROCEDURE — 85025 COMPLETE CBC W/AUTO DIFF WBC: CPT

## 2020-04-09 PROCEDURE — 80053 COMPREHEN METABOLIC PANEL: CPT

## 2020-04-09 PROCEDURE — 71046 X-RAY EXAM CHEST 2 VIEWS: CPT

## 2020-04-09 NOTE — RAD
EXAM:

Chest PA and lateral:



HISTORY:

Shortness of breath 



COMPARISON:

4/12/2006, 8/30/2018



FINDINGS:



Heart: Normal cardiac silhouette

Aorta: Unremarkable

Pulmonary vessels: Normal

Costophrenic angles: Costophrenic angles are clear. 

Lungs: No consolidation or masses. There does appear to be patchy interstitial prominence.

Pneumothorax: No pneumothorax

Osseous structures: No osseous abnormalities

IMPRESSION:

Patchy interstitial prominence. Correlate for edema or interstitial infiltrate.







Reported By: Nazia Neff 

Electronically Signed:  4/9/2020 4:07 PM

## 2020-07-10 ENCOUNTER — HOSPITAL ENCOUNTER (OUTPATIENT)
Dept: HOSPITAL 92 - ERS | Age: 50
Setting detail: OBSERVATION
LOS: 4 days | Discharge: HOME | End: 2020-07-14
Attending: INTERNAL MEDICINE | Admitting: INTERNAL MEDICINE
Payer: COMMERCIAL

## 2020-07-10 VITALS — BODY MASS INDEX: 31.8 KG/M2

## 2020-07-10 DIAGNOSIS — Z88.5: ICD-10-CM

## 2020-07-10 DIAGNOSIS — Z20.828: ICD-10-CM

## 2020-07-10 DIAGNOSIS — J45.909: ICD-10-CM

## 2020-07-10 DIAGNOSIS — R55: Primary | ICD-10-CM

## 2020-07-10 DIAGNOSIS — Z79.899: ICD-10-CM

## 2020-07-10 DIAGNOSIS — I10: ICD-10-CM

## 2020-07-10 DIAGNOSIS — E78.00: ICD-10-CM

## 2020-07-10 DIAGNOSIS — R07.89: ICD-10-CM

## 2020-07-10 DIAGNOSIS — K21.9: ICD-10-CM

## 2020-07-10 DIAGNOSIS — Z79.82: ICD-10-CM

## 2020-07-10 LAB
ALBUMIN SERPL BCG-MCNC: 3.9 G/DL (ref 3.5–5)
ALP SERPL-CCNC: 79 U/L (ref 40–110)
ALT SERPL W P-5'-P-CCNC: 36 U/L (ref 8–55)
ANION GAP SERPL CALC-SCNC: 10 MMOL/L (ref 10–20)
AST SERPL-CCNC: 26 U/L (ref 5–34)
BASOPHILS # BLD AUTO: 0.1 THOU/UL (ref 0–0.2)
BASOPHILS NFR BLD AUTO: 1.1 % (ref 0–1)
BILIRUB SERPL-MCNC: 0.6 MG/DL (ref 0.2–1.2)
BUN SERPL-MCNC: 11 MG/DL (ref 9.8–20.1)
CALCIUM SERPL-MCNC: 9.2 MG/DL (ref 7.8–10.44)
CHLORIDE SERPL-SCNC: 107 MMOL/L (ref 98–107)
CO2 SERPL-SCNC: 28 MMOL/L (ref 22–29)
CREAT CL PREDICTED SERPL C-G-VRATE: 0 ML/MIN (ref 70–130)
EOSINOPHIL # BLD AUTO: 0.3 THOU/UL (ref 0–0.7)
EOSINOPHIL NFR BLD AUTO: 4.2 % (ref 0–10)
GLOBULIN SER CALC-MCNC: 3.6 G/DL (ref 2.4–3.5)
GLUCOSE SERPL-MCNC: 100 MG/DL (ref 70–105)
HGB BLD-MCNC: 12 G/DL (ref 12–16)
LYMPHOCYTES # BLD: 2.9 THOU/UL (ref 1.2–3.4)
LYMPHOCYTES NFR BLD AUTO: 40.4 % (ref 21–51)
MCH RBC QN AUTO: 23.5 PG (ref 27–31)
MCV RBC AUTO: 72.8 FL (ref 78–98)
MDIFF COMPLETE?: YES
MICROCYTES BLD QL SMEAR: (no result) (100X)
MONOCYTES # BLD AUTO: 0.5 THOU/UL (ref 0.11–0.59)
MONOCYTES NFR BLD AUTO: 6.4 % (ref 0–10)
NEUTROPHILS # BLD AUTO: 3.4 THOU/UL (ref 1.4–6.5)
NEUTROPHILS NFR BLD AUTO: 48 % (ref 42–75)
PLATELET # BLD AUTO: 282 THOU/UL (ref 130–400)
POTASSIUM SERPL-SCNC: 3.6 MMOL/L (ref 3.5–5.1)
RBC # BLD AUTO: 5.12 MILL/UL (ref 4.2–5.4)
SODIUM SERPL-SCNC: 141 MMOL/L (ref 136–145)
TROPONIN I SERPL DL<=0.01 NG/ML-MCNC: (no result) NG/ML (ref ?–0.03)
TROPONIN I SERPL DL<=0.01 NG/ML-MCNC: (no result) NG/ML (ref ?–0.03)
WBC # BLD AUTO: 7.2 THOU/UL (ref 4.8–10.8)

## 2020-07-10 PROCEDURE — 80053 COMPREHEN METABOLIC PANEL: CPT

## 2020-07-10 PROCEDURE — G0378 HOSPITAL OBSERVATION PER HR: HCPCS

## 2020-07-10 PROCEDURE — 93306 TTE W/DOPPLER COMPLETE: CPT

## 2020-07-10 PROCEDURE — U0003 INFECTIOUS AGENT DETECTION BY NUCLEIC ACID (DNA OR RNA); SEVERE ACUTE RESPIRATORY SYNDROME CORONAVIRUS 2 (SARS-COV-2) (CORONAVIRUS DISEASE [COVID-19]), AMPLIFIED PROBE TECHNIQUE, MAKING USE OF HIGH THROUGHPUT TECHNOLOGIES AS DESCRIBED BY CMS-2020-01-R: HCPCS

## 2020-07-10 PROCEDURE — 87635 SARS-COV-2 COVID-19 AMP PRB: CPT

## 2020-07-10 PROCEDURE — 70553 MRI BRAIN STEM W/O & W/DYE: CPT

## 2020-07-10 PROCEDURE — 85025 COMPLETE CBC W/AUTO DIFF WBC: CPT

## 2020-07-10 PROCEDURE — 36415 COLL VENOUS BLD VENIPUNCTURE: CPT

## 2020-07-10 PROCEDURE — 95819 EEG AWAKE AND ASLEEP: CPT

## 2020-07-10 PROCEDURE — 84484 ASSAY OF TROPONIN QUANT: CPT

## 2020-07-10 PROCEDURE — 95816 EEG AWAKE AND DROWSY: CPT

## 2020-07-10 PROCEDURE — 93005 ELECTROCARDIOGRAM TRACING: CPT

## 2020-07-10 PROCEDURE — 71045 X-RAY EXAM CHEST 1 VIEW: CPT

## 2020-07-10 PROCEDURE — 95957 EEG DIGITAL ANALYSIS: CPT

## 2020-07-10 PROCEDURE — 95712 VEEG 2-12 HR INTMT MNTR: CPT

## 2020-07-10 RX ADMIN — NITROGLYCERIN PRN MG: 0.4 TABLET SUBLINGUAL at 23:18

## 2020-07-10 RX ADMIN — NITROGLYCERIN PRN MG: 0.4 TABLET SUBLINGUAL at 17:33

## 2020-07-10 NOTE — CON
NEUROLOGY CONSULTATION



DATE OF CONSULTATION:  07/10/2020



HISTORY OF PRESENT ILLNESS:  Ms. Sandy Saucedo is a 50-year-old 
female

with history significant for recurrent syncopal episodes and seizure disorder,

presented to the emergency room with chest pain.  Per patient,  

 she is having chest pain with a feeling like flutter in the chest which 

 radiates to her left arm; However, she has also been having episodes; during

which  has racing of the heart and she becomes extremely tired, weak, and loses

of awareness, and it takes several days to get back to her baseline.  The 
symptoms

were characterized by extreme fatigue.  Her coworkers have seen her whole-body

locked up during this episode, but the patient denies any jerking, nausea, 
vomiting,

urinary or fecal incontinence or tongue bite associated with these episode.  She

does have history of seizures in 2013.  She had a one time seizure and was on

Dilantin for one year; after which, she was tapered off.  She was seen by a

neurologist in [place], which she does not remember the name.  The 
patient

has been exposed to COVID by coworkers and  currently, is a COVID rule out.

She was admitted for evaluation of chest pain and recurrent syncopal episode. 



REVIEW OF SYSTEMS:  All 14 systems were reviewed and were negative except the

pertinent positive and negative mentioned in the HPI. 



MEDICATIONS:  Tylenol p.r.n.



PAST MEDICAL AND SURGICAL HISTORY:  Asthma, hyperlipidemia, cardiac 
catheterization

in 2011, and hysterectomy. 



FAMILY HISTORY:  Significant for lung cancer, heart disease, diabetes, but no

history of epilepsy. 



SOCIAL HISTORY:  The patient quit smoking 31 years ago.  Denies alcohol or 
illegal

drug use. 





 ALLERGIES: Codeine



 

Active Medications







Generic Name Dose Route Start Last Admin



  Trade Name Freq  PRN Reason Stop Dose Admin

 

Acetaminophen  650 mg  07/10/20 13:11  07/10/20 14:24



  Tylenol  PO  07/10/20 23:00  650 mg



  Q4H PRN   Administration



  Headache/Fever or Pain   



     



     



     







 

PHYSICAL EXAMINATION:

 

Blood pressure 140/80, pulse 80, and respiratory rate 18. 

General Appearance: NAD, awake alert

Eye: PERRL, anicteric sclera

ENT: normocephalic atraumatic, no oropharyngeal lesions

Neck: no JVD

Heart: RRR, no murmur, no gallops, no rubs

Respiratory: CTAB, no wheezes, no rales, no rhonchi

Gastrointestinal: soft, non-tender, non-distended, normal bowel sounds

Extremities: no cyanosis, no clubbing, no edema

Skin: normal turgor, no lesions, no rashes

Neurological:  Mental status, the patient is alert and oriented to person, place
, and

time.  Recent and remote memory intact.  Fund of knowledge is appropriate.  
Speech

is clear.  Cranial nerves 2 through 12 intact.  Motor, muscle tone and bulk are

normal.  Strength 5/5 bilaterally.  Sensory intact.  Gait deferred due to the

patient's safety reason. 





 



 07/10/20 10:12

Lab results: 

 







WBC  7.2 thou/uL (4.8-10.8)   07/10/20  10:12    

 

Hgb  12.0 g/dL (12.0-16.0)   07/10/20  10:12    

 

Hct  37.3 % (36.0-47.0)   07/10/20  10:12    

 

MCV  72.8 fL (78.0-98.0)  L  07/10/20  10:12    

 

Plt Count  282 thou/uL (130-400)   07/10/20  10:12    

 

Neutrophils %  48.0 % (42.0-75.0)   07/10/20  10:12    

 

Sodium  141 mmol/L (136-145)   07/10/20  10:12    

 

Potassium  3.6 mmol/L (3.5-5.1)   07/10/20  10:12    

 

Chloride  107 mmol/L ()   07/10/20  10:12    

 

Carbon Dioxide  28 mmol/L (22-29)   07/10/20  10:12    

 

BUN  11 mg/dL (9.8-20.1)   07/10/20  10:12    

 

Creatinine  0.74 mg/dL (0.6-1.1)   07/10/20  10:12    

 

Glucose  100 mg/dL ()   07/10/20  10:12    

 

Calcium  9.2 mg/dL (7.8-10.44)   07/10/20  10:12    

 

Total Bilirubin  0.6 mg/dL (0.2-1.2)   07/10/20  10:12    

 

AST  26 U/L (5-34)   07/10/20  10:12    

 

ALT  36 U/L (8-55)   07/10/20  10:12    

 

Alkaline Phosphatase  79 U/L ()   07/10/20  10:12    

 

Troponin I  Less than  0.010 ng/mL (< 0.028)   07/10/20  13:20    

 

Serum Total Protein  7.5 g/dL (6.0-8.3)   07/10/20  10:12    

 

Albumin  3.9 g/dL (3.5-5.0)   07/10/20  10:12    









- EKG Interpretation

EKG: 



sinus bradycardia



ASSESSMENT AND PLAN:  Ms. Sandy Saucedo was consulted for episodes of

recurrent syncope; however, the episodes described associated with racing of the

heart and weakness and rigidity and altered awareness.  She is having these 
episodes

at least once a month or every other month.  Consider MRI of the brain to rule 
out

acute intracranial process.  Recommend EEG to rule out underlying seizure 
activity.

Observe seizure precaution.  Continue medical management for chest pain per 
primary

team and Radiology.  Continue aspirin.  Further testing will be done once the

patient is ruled out for COVID.  Further recommendations depend upon the 
results of

the above testing.  We will continue to follow. 



Thank you for the consult.







Job ID:  [id number]



[facility]

## 2020-07-10 NOTE — RAD
Chest AP view



INDICATION: Chest pain predominantly left-sided



COMPARISON: April 9, 2020



FINDINGS:



Lungs:  The lungs are clear



Cardiac silhouette:  The cardiomediastinal silhouette appears within normal limits.



Pulmonary vasculature:  Normal



Pleural spaces:  No pleural effusion or pneumothorax is demonstrated.



Upper abdomen:  No abnormality seen.



Osseous structures:  No acute osseous abnormality.



Additional findings:  None.



IMPRESSION: 

No acute cardiopulmonary abnormality.



Reported By: Tobin Singh 

Electronically Signed:  7/10/2020 10:37 AM

## 2020-07-10 NOTE — PDOC.HHP
Hospitalist HPI





- History of Present Illness


recurrent syncope, chest pain 


History of Present Illness: 











This is a 50 year old female with history of recurrent syncope, seizure who 

presented to the ER with chest pain. THe patient states that for the past few 

months she has been having constant chest pain, described as a fluttery 

sensation in her chest and a sensation of blood sinking in her chest. It does 

not radiate. She denies association with food. SHe denies pain with exertion.  

On occasion, when she gets the fluttery sensations in  her chest she develops a 

severe headache and passes out shortly afterwards for a few minutes. Prior to 

passing out she has no olfactory or gustatory hallucinations, but has been told 

by her co-workers that she grabs on her to knees and her entire body locks up. 

SHe does not have tongue biting, arm jerking or urine incontinence.  She is 

confused after she wakes up and states she feels drained for days. She also 

experiences short term memory loss after these episodes.  She states for the 

past few months these syncopal episodes occurred once a week but now they have 

been occuring closer together. She had a televisit with Dr. Baca who 

advised her to come to the hospital. She was supposed to get a loop recorder 

placed with him this month. 





She denies fevers, chills. She has an occasional dry cough and chronic runny 

nose on the right nostril.  SHe reports a negative COVID test June 9th but 

subsequently had exposure to five co-workers who tested positive for COVID. SHe 

is a CNA. 





The patient reports history of seizures in the past back in 2013.  She states 

she was on dilantin for a year and it was eventually discontinued and she had 

no further seizures.  When she was diagnosed with seizure, she stated that she 

had twitching on her face and tongue biting and would pass out. She had a 

positive EEG at the time but has not had any others


ED Course: 





The patient presented with normal vitals with /81. 





Hospitalist ROS





- Review of Systems


Constitutional: denies: fever, chills


Eyes: denies: vision change


ENT: denies: ear pain, ear discharge


Respiratory: reports: cough (occasional), shortness of breath


Cardiovascular: reports: chest pain, palpitations.  denies: orthopnea


Gastrointestinal: denies: nausea, vomiting, abdominal pain, diarrhea


Genitourinary: denies: dysuria, frequency


Musculoskeletal: denies: neck pain, shoulder pain


Neurological: denies: weakness, numbness





- Medication


Medications: 


Active Medications











Generic Name Dose Route Start Last Admin





  Trade Name Freq  PRN Reason Stop Dose Admin


 


Acetaminophen  650 mg  07/10/20 13:11  07/10/20 14:24





  Tylenol  PO  07/10/20 23:00  650 mg





  Q4H PRN   Administration





  Headache/Fever or Pain   





     





     





     














Hospitalist History





- Past Medical History


Other Medical History: 





Asthma


Hyperlipidemia








- Past Surgical History


Other Surgical History: 





Cardiac cath 2011


RIght breast cells removed


Hysterectomy








- Family History


Other Family History: 





Lung cancer in family


Heart disease in grandmother at 72


Lupus in cousin


Diabetes in family








- Social History


Smoking Status: Former smoker (Quit smoking 31 years ago, smoked from the age 

of 10. Smoked on weekends)


Alcohol: reports: None


Drugs: reports: none





- Exam


General Appearance: NAD, awake alert


Eye: PERRL, anicteric sclera


ENT: normocephalic atraumatic, no oropharyngeal lesions


Neck: no JVD


Heart: RRR, no murmur, no gallops, no rubs


Respiratory: CTAB, no wheezes, no rales, no ronchi


Gastrointestinal: soft, non-tender, non-distended, normal bowel sounds


Extremities: no cyanosis, no clubbing, no edema


Skin: normal turgor, no lesions, no rashes


Neurological: cranial nerve grossly intact, normal sensation to touch, no focal 

deficits, no new deficit


Musculoskeletal: normal tone, normal strength, no muscle wasting





Hospitalist Results





- Labs


Result Diagrams: 


 07/10/20 10:12





 07/10/20 10:12


Lab results: 


 











WBC  7.2 thou/uL (4.8-10.8)   07/10/20  10:12    


 


Hgb  12.0 g/dL (12.0-16.0)   07/10/20  10:12    


 


Hct  37.3 % (36.0-47.0)   07/10/20  10:12    


 


MCV  72.8 fL (78.0-98.0)  L  07/10/20  10:12    


 


Plt Count  282 thou/uL (130-400)   07/10/20  10:12    


 


Neutrophils %  48.0 % (42.0-75.0)   07/10/20  10:12    


 


Sodium  141 mmol/L (136-145)   07/10/20  10:12    


 


Potassium  3.6 mmol/L (3.5-5.1)   07/10/20  10:12    


 


Chloride  107 mmol/L ()   07/10/20  10:12    


 


Carbon Dioxide  28 mmol/L (22-29)   07/10/20  10:12    


 


BUN  11 mg/dL (9.8-20.1)   07/10/20  10:12    


 


Creatinine  0.74 mg/dL (0.6-1.1)   07/10/20  10:12    


 


Glucose  100 mg/dL ()   07/10/20  10:12    


 


Calcium  9.2 mg/dL (7.8-10.44)   07/10/20  10:12    


 


Total Bilirubin  0.6 mg/dL (0.2-1.2)   07/10/20  10:12    


 


AST  26 U/L (5-34)   07/10/20  10:12    


 


ALT  36 U/L (8-55)   07/10/20  10:12    


 


Alkaline Phosphatase  79 U/L ()   07/10/20  10:12    


 


Troponin I  Less than  0.010 ng/mL (< 0.028)   07/10/20  13:20    


 


Serum Total Protein  7.5 g/dL (6.0-8.3)   07/10/20  10:12    


 


Albumin  3.9 g/dL (3.5-5.0)   07/10/20  10:12    














- EKG Interpretation


EKG: 





sinus bradycardia








Hospitalist H&P A/P





- Plan


Plan: 








This is a 50 year old female with asthma presenting with chest pain and current 

syncope








#Chest pain


#Syncope


- syncope possibly could be secondary to an arrhythmia given flutter sensations 

in her chest. Alternatively she could have seizure given that she has rigidity 

and postictal confusion during her episodes and has had seizures in the past


- she will be monitored on telemetry


- first two troponin negative, will check one more 


- EKG showed sinus bradycardia


- will consult neurology and cardiology


- continue aspirin


- check orthostatics











COVID exposure 


- pt reports 5 coworkers that tested postive for COVID


- COVID test ordered








Hypertension 


- continue imdur








GERD


- continue protonix








Code status: full code

## 2020-07-10 NOTE — CON
DATE OF CONSULTATION:  07/10/2020



INDICATION FOR CONSULTATION:  A 50-year-old female with possible syncopal episode.



HISTORY OF PRESENT ILLNESS:  This very pleasant 50-year-old female has been

complaining of some episodes of possible seizures versus syncope, on close

questioning of the patient, she has had no minda syncopal episodes.  She has not

passed out.  She has had a couple of these episodes witnessed, where she started

rubbing her legs and then later on becomes like she is unresponsive to what is going

on around her, but is not unconscious.  She did have a history of seizures in the

past and was placed on Dilantin I believe for about a year and then this was

discontinued.  These events occur infrequently.  She was I believe scheduled to

undergo a loop recorder next week with the thought that this could be an

arrhythmias, but on close discussion with the patient, it does not appear to be that

way.  She did also tell me that she had some pain in her chest a couple days ago or

yesterday with a sharp stabbing pain, which radiated down the left arm and these

were also stabbing type pains, did not appear to be pressure in nature.  She does

have problems with blood pressure at times even this afternoon while I am seeing,

her blood pressure is elevated, it was 175/102 and earlier on admission, the blood

pressure was normal in the 120s systolically.  She has also complained of having

headaches sometimes prior to having the events and then not feeling well, but then

she is confused after the event.  She has had a workup in the past.  She has had a

cardiac catheterization in  with normal coronary arteries.  In 2019, in

December, she had an echocardiogram, which showed a normal ejection fraction and a

carotid evaluation also has been unremarkable. 



PAST MEDICAL HISTORY:  Significant for hypertension, hyperlipidemia, history of

anxiety, and history of asthma.  She has a borderline diabetes.  She has I believe a

history of seizures in the past.  She also has a history of tobacco abuse.  She has

had a bilateral tubal ligation.  She had a total abdominal hysterectomy.  She has

had some abnormal tissue removed from the right breast. 



FAMILY HISTORY:  Her mother  from cancer, obviously she had lung cancer.  Her

father also is .  She has had her son also has a history of seizures. 



SOCIAL HISTORY:  She does not smoke.  She has smoked like 30 years, but her 

smokes.  She gets secondhand smoke.  There is no significant alcohol use. 



ALLERGIES:  SHE IS ALLERGIC TO CODEINE, WHICH CAUSES A RASH.



MEDICATIONS:  Please note her medication, she is on;

1. ProAir aerosol solutions one puff as needed every 4 hours.

2. She is on ferrous sulfate 325 mg a day.

3. Vitamin D tablets.

4. She is on vitamin C.

5. She takes atorvastatin 20 mg a day.

6. Aspirin 81 mg a day.

7. She is on pantoprazole 40 mg a day.

8. Isosorbide mononitrate 30 mg once a day.



REVIEW OF SYSTEMS:  A 12-point review of systems is relatively unremarkable except

what is noted in the history of present illness. 



PHYSICAL EXAMINATION:

GENERAL:  Reveals a well-developed, well-nourished female, who is in no acute

distress at this time.  She is alert.  She is oriented. 

VITAL SIGNS:  Did show blood pressure 175/102.  She does not appear to be

orthostatic.  This pressures were also checked and actually the pressure lying down

was less than the one sitting or standing, which were consistently elevated.  Her

heart rate is 63 and regular.  She is afebrile.  O2 saturation 100% on room air and

respiratory rate is 20. 

HEENT:  Shows the head to be normocephalic and atraumatic.  Carotid pulses are

present.  I did not hear any significant bruits. 

CHEST:  Clear to auscultation without rales, rhonchi, or wheezing. 

CARDIOVASCULAR:  Revealed a regular rate and rhythm.  There were no significant

murmurs, heaves, thrills, bruits, or rubs. 

ABDOMEN:  Shows the abdomen to be obese.  Positive bowel sounds are present.  No

organomegaly or tenderness noted. 

EXTREMITIES:  Show no clubbing, cyanosis, or edema.  Pedal pulses are present. 

NEUROLOGIC:  At this time, the patient appears to be fully intact. 

SKIN:  Warm and dry. 

PSYCHOSOCIAL:  Also appears to be normal.



LABORATORY DATA:  Shows a WBC of 7.2, hemoglobin was 12, and platelet count was

282,000.  Sodium is 141, potassium 3.6, BUN was 11, creatinine 0.74, blood sugar was

100.  Cardiac enzymes are negative.  EKG is also unremarkable.  She has a normal

sinus rhythm with a heart rate of 59 beats per minute. 



IMPRESSION AND PLAN:  

1. A 50-year-old female with some type of the event, which actually appears to me to

be more of a seizure type problem than actual syncope.  She has not had any absolute

minda syncopal episodes and she does have confusion after the event.  She will be

monitored here on the floor.  We will certainly consider whether or not to place a

loop recorder.  I believe she should have a neurological evaluation.  Certainly

could consider starting her back on her antiseizure medications and see whether her

symptoms improve as she has been on medication in the past with a history of

seizures and this has been stopped several years ago. 

2. History of hypertension.  We will need to start her on medications for her blood

pressure.  She was on isosorbide mononitrate. 

3. History of dyslipidemia.  She will continue on her medications in the form of

atorvastatin.  At this time, we will continue to monitor the patient, uncertain as

to whether or not she has any acute events, whether she is having any syncopal

episodes.  Hopefully, she will have another event while being in the hospital we can

witness this to see exactly what is going on from a cardiac standpoint. 

4. History of atypical chest discomfort, chest pain with negative cardiac enzymes,

normal EKG and normal coronaries in . continue to monitor the patient. 



Thank you very much for asking us to participate in her care.







Job ID:  [id number]

## 2020-07-11 RX ADMIN — NITROGLYCERIN PRN MG: 0.4 TABLET SUBLINGUAL at 23:10

## 2020-07-11 RX ADMIN — NITROGLYCERIN PRN MG: 0.4 TABLET SUBLINGUAL at 23:15

## 2020-07-11 RX ADMIN — Medication SCH UNITS: at 08:25

## 2020-07-11 RX ADMIN — ASPIRIN 81 MG CHEWABLE TABLET SCH MG: 81 TABLET CHEWABLE at 08:26

## 2020-07-11 RX ADMIN — NITROGLYCERIN PRN MG: 0.4 TABLET SUBLINGUAL at 23:00

## 2020-07-11 NOTE — PDOC.CPN
- Subjective


Date: 07/11/20


Time: 15:34


Interval history: 





The pt seen and examined.  No overnight events.  No cardiac complaints. 





- Objective


Allergies/Adverse Reactions: 


 Allergies











Allergy/AdvReac Type Severity Reaction Status Date / Time


 


codeine Allergy Mild Rash Verified 07/10/20 13:34











Visit Medications: 


 Current Medications





Acetaminophen (Tylenol)  650 mg PO Q6H PRN


   PRN Reason: Headache/Fever or Pain


Aspirin (Aspirin Chewable)  81 mg PO QAM [facility]


   Last Admin: 07/11/20 08:26 Dose:  81 mg


Atorvastatin Calcium (Lipitor)  20 mg PO HS [facility]


   Last Admin: 07/10/20 20:03 Dose:  20 mg


Cholecalciferol (Vitamin D3)  1,000 units PO DAILY [facility]


   Last Admin: 07/11/20 08:25 Dose:  1,000 units


Ferrous Sulfate (Feosol)  325 mg PO DAILY [facility]


   Last Admin: 07/11/20 08:26 Dose:  325 mg


Isosorbide Dinitrate (Isordil)  30 mg PO DAILY [facility]


   Last Admin: 07/11/20 09:21 Dose:  30 mg


Nitroglycerin (Nitrostat)  0.4 mg SL Q5MIN PRN


   PRN Reason: Chest Pain


   Last Admin: 07/10/20 23:18 Dose:  0.4 mg


Ondansetron HCl (Zofran)  4 mg IVP Q6H PRN


   PRN Reason: Nausea/Vomiting


Ondansetron HCl (Zofran Odt)  4 mg PO Q6H PRN


   PRN Reason: Nausea/Vomiting


Pantoprazole Sodium (Protonix)  40 mg PO DAILY [facility]


   Last Admin: 07/11/20 08:26 Dose:  40 mg








Vital Signs & Weight: 


 Vital Signs











  Temp Pulse Resp BP BP BP Pulse Ox


 


 07/11/20 11:58  98.3 F  67  16   120/65   99


 


 07/11/20 08:27  98.6 F  62  18  123/78    99


 


 07/11/20 05:52  98.3 F  57 L  15    140/63  99








 











Weight                         174 lb

















- Physical Exam


General: alert & oriented x3


HEENT: mucus membranes moist


Neck: supple neck


Cardiac: regular rate and rhythm, S1/S2


Lungs: clear to auscultation


Extremities: no edema





- Labs


Result Diagrams: 


 07/10/20 10:12





 07/10/20 10:12


 Troponin/CKMB











Troponin I  Less than  0.010 ng/mL (< 0.028)   07/10/20  16:05    














- Telemetry


Sinus rhythms and dysrhythmias: sinus rhythm





- Assessment/Plan


Assessment/Plan: 





1. s/p the event of passing out 2/2 seizure vs syncopal episode - the pt is on 

tele which has not show any arrhythmia or pauses. 


2. HTN - well controlled with current med


3. HLD - on statin


4. Atypical chest pain - asymptomatic for now


5. Hx of Sz?


6. Borderline DM


7. Hx of Asthma


8. COVID exposure 


MAR reviewed





* waiting for COVID test result


* Dr Baca' pt

## 2020-07-11 NOTE — PDOC.HOSPP
- Subjective


Encounter Date: 07/11/20


Encounter Time: 14:51


Subjective: 





pt seen today, COVID result may not be available until 12th- talk to her at 

length over the phone. 


 No CP today, but slight HA and hx of migraine and prefers not to take any 

medicine. 





- Objective


Vital Signs & Weight: 


 Vital Signs (12 hours)











  Temp Pulse Resp BP BP BP Pulse Ox


 


 07/11/20 11:58  98.3 F  67  16   120/65   99


 


 07/11/20 08:27  98.6 F  62  18  123/78    99


 


 07/11/20 05:52  98.3 F  57 L  15    140/63  99








 Weight











Weight                         174 lb














I&O: 


 











 07/10/20 07/11/20 07/12/20





 06:59 06:59 06:59


 


Intake Total  1560 


 


Balance  1560 











Result Diagrams: 


 07/10/20 10:12





 07/10/20 10:12





Hospitalist ROS





- Medication


Medications: 


Active Medications











Generic Name Dose Route Start Last Admin





  Trade Name Freq  PRN Reason Stop Dose Admin


 


Aspirin  81 mg  07/11/20 09:00  07/11/20 08:26





  Aspirin Chewable  PO   81 mg





  QAM ROSI   Administration





     





     





     





     


 


Atorvastatin Calcium  20 mg  07/10/20 21:00  07/10/20 20:03





  Lipitor  PO   20 mg





  HS ROSI   Administration





     





     





     





     


 


Cholecalciferol  1,000 units  07/11/20 09:00  07/11/20 08:25





  Vitamin D3  PO   1,000 units





  DAILY ROSI   Administration





     





     





     





     


 


Ferrous Sulfate  325 mg  07/11/20 09:00  07/11/20 08:26





  Feosol  PO   325 mg





  DAILY ROSI   Administration





     





     





     





     


 


Isosorbide Dinitrate  30 mg  07/11/20 09:00  07/11/20 09:21





  Isordil  PO   30 mg





  DAILY ROSI   Administration





     





     





     





     


 


Nitroglycerin  0.4 mg  07/10/20 17:30  07/10/20 23:18





  Nitrostat  SL   0.4 mg





  Q5MIN PRN   Administration





  Chest Pain   





     





     





     


 


Pantoprazole Sodium  40 mg  07/11/20 09:00  07/11/20 08:26





  Protonix  PO   40 mg





  DAILY ROSI   Administration





     





     





     





     














- Exam


General Appearance: NAD, awake alert


Neurological: no focal deficits


Psychiatric: A&O x 3





Hosp A/P





- Plan














#Syncope with prodromal sxs of palipitations and weakness


- syncope possibly could be secondary to an arrhythmia given flutter sensations 

in her chest. Alternatively she could have seizure given that she has rigidity 

and postictal confusion during her episodes and has had seizures in the past


- she will be monitored on telemetry





Ayptical CP


- -ruled out  for ACS; nl EKG


-asa, lipitor and imdur - home regimen


-cath in 2011- no obstruction.


-cariology will follow up after COVID r/o





hx of seizure


-no AED as home regimen


-pending COVID for workup








Prob tension HA


- tylenol prn








COVID exposure 


- pt reports 5 coworkers that tested postive for COVID


- COVID test ordered








Hypertension 


- continue imdur








GERD


- continue protonix





COVID result -  tomorrow


then cardiac workup, MRI and EEG.

## 2020-07-12 RX ADMIN — ASPIRIN 81 MG CHEWABLE TABLET SCH MG: 81 TABLET CHEWABLE at 08:03

## 2020-07-12 RX ADMIN — Medication SCH UNITS: at 08:03

## 2020-07-12 NOTE — PDOC.CPN
- Subjective


Date: 07/12/20


Time: 15:25


Interval history: 





The pt seen and examined.  No overnight events.  No cardiac complaints.  COVID 

19 was negative





- Objective


Allergies/Adverse Reactions: 


 Allergies











Allergy/AdvReac Type Severity Reaction Status Date / Time


 


codeine Allergy Mild Rash Verified 07/10/20 13:34











Visit Medications: 


 Current Medications





Acetaminophen (Tylenol)  650 mg PO Q6H PRN


   PRN Reason: Headache/Fever or Pain


   Last Admin: 07/11/20 16:02 Dose:  650 mg


Aspirin (Aspirin Chewable)  81 mg PO QAM Former[facility]


   Last Admin: 07/12/20 08:03 Dose:  81 mg


Atorvastatin Calcium (Lipitor)  20 mg PO HS [facility]


   Last Admin: 07/11/20 20:19 Dose:  20 mg


Cholecalciferol (Vitamin D3)  1,000 units PO DAILY [facility]


   Last Admin: 07/12/20 08:03 Dose:  1,000 units


Ferrous Sulfate (Feosol)  325 mg PO DAILY [facility]


   Last Admin: 07/12/20 08:03 Dose:  325 mg


Isosorbide Dinitrate (Isordil)  30 mg PO DAILY [facility]


   Last Admin: 07/12/20 08:03 Dose:  30 mg


Nitroglycerin (Nitrostat)  0.4 mg SL Q5MIN PRN


   PRN Reason: Chest Pain


   Last Admin: 07/11/20 23:15 Dose:  0.4 mg


Ondansetron HCl (Zofran)  4 mg IVP Q6H PRN


   PRN Reason: Nausea/Vomiting


Ondansetron HCl (Zofran Odt)  4 mg PO Q6H PRN


   PRN Reason: Nausea/Vomiting


Pantoprazole Sodium (Protonix)  40 mg PO DAILY [facility]


   Last Admin: 07/12/20 08:03 Dose:  40 mg








Vital Signs & Weight: 


 Vital Signs











  Temp Pulse Resp BP BP BP Pulse Ox


 


 07/12/20 11:58  98.3 F  79  18   109/79   99


 


 07/12/20 08:03  98.3 F  62  18    147/73 H  99


 


 07/12/20 04:25  98.5 F  59 L  20  121/66    100








 











Weight                         174 lb

















- Physical Exam


General: alert & oriented x3


HEENT: mucus membranes moist


Neck: supple neck


Cardiac: regular rate and rhythm, S1/S2


Lungs: clear to auscultation


Extremities: no edema





- Labs


Result Diagrams: 


 07/10/20 10:12





 07/10/20 10:12


 Troponin/CKMB











Troponin I  Less than  0.010 ng/mL (< 0.028)   07/10/20  16:05    














- Telemetry


Sinus rhythms and dysrhythmias: sinus rhythm





- Assessment/Plan


Assessment/Plan: 





1. s/p the event of passing out 2/2 seizure vs syncopal episode - the pt is on 

tele which has not show any arrhythmia or pauses. The pt would like to have 

LINQ during the hospital stay if possible.


2. HTN - Isosorbide was changed from Dinitrate qd to mononitirate qd (as her 

med list)


3. HLD - on statin


4. Atypical chest pain - asymptomatic for now


5. Hx of Sz?


6. Borderline DM


7. Hx of Asthma


8. COVID exposure 


MAR reviewed





* COVID test was negative


* Dr Baca' pt

## 2020-07-12 NOTE — PDOC.HOSPP
- Subjective


Encounter Date: 07/12/20


Encounter Time: 15:55


Subjective: 





pt seen after covid negative. says that she has chest soreness but no pain, 

happy w.. neg test for COVID.





- Objective


Vital Signs & Weight: 


 Vital Signs (12 hours)











  Temp Pulse Resp BP BP BP Pulse Ox


 


 07/12/20 11:58  98.3 F  79  18   109/79   99


 


 07/12/20 08:03  98.3 F  62  18    147/73 H  99


 


 07/12/20 04:25  98.5 F  59 L  20  121/66    100








 Weight











Weight                         174 lb














I&O: 


 











 07/11/20 07/12/20 07/13/20





 06:59 06:59 06:59


 


Intake Total 1560 1520 


 


Balance 1560 1520 











Result Diagrams: 


 07/10/20 10:12





 07/10/20 10:12





Hospitalist ROS





- Medication


Medications: 


Active Medications











Generic Name Dose Route Start Last Admin





  Trade Name Freq  PRN Reason Stop Dose Admin


 


Acetaminophen  650 mg  07/11/20 14:54  07/11/20 16:02





  Tylenol  PO   650 mg





  Q6H PRN   Administration





  Headache/Fever or Pain   





     





     





     


 


Aspirin  81 mg  07/11/20 09:00  07/12/20 08:03





  Aspirin Chewable  PO   81 mg





  QAM ROSI   Administration





     





     





     





     


 


Atorvastatin Calcium  20 mg  07/10/20 21:00  07/11/20 20:19





  Lipitor  PO   20 mg





  HS ROSI   Administration





     





     





     





     


 


Cholecalciferol  1,000 units  07/11/20 09:00  07/12/20 08:03





  Vitamin D3  PO   1,000 units





  DAILY ROSI   Administration





     





     





     





     


 


Ferrous Sulfate  325 mg  07/11/20 09:00  07/12/20 08:03





  Feosol  PO   325 mg





  DAILY ROSI   Administration





     





     





     





     


 


Nitroglycerin  0.4 mg  07/10/20 17:30  07/11/20 23:15





  Nitrostat  SL   0.4 mg





  Q5MIN PRN   Administration





  Chest Pain   





     





     





     


 


Pantoprazole Sodium  40 mg  07/11/20 09:00  07/12/20 08:03





  Protonix  PO   40 mg





  DAILY ROSI   Administration





     





     





     





     














- Exam


General Appearance: NAD, awake alert


Eye: PERRL


ENT: normocephalic atraumatic


Neck: supple


Heart: RRR


Respiratory: CTAB, normal chest expansion


Gastrointestinal: soft, normal bowel sounds


Neurological: no focal deficits





Hosp A/P





- Plan














#Syncope with prodromal sxs of palipitations and weakness


- syncope possibly could be secondary to an arrhythmia given flutter sensations 

in her chest. Alternatively she could have seizure given that she has rigidity 

and postictal confusion during her episodes and has had seizures in the past


- she will be monitored on telemetry





Ayptical CP


- -ruled out  for ACS; nl EKG


-asa, lipitor and imdur - home regimen


-cath in 2011- no obstruction.


-cariology will follow up after COVID r/o





hx of seizure


-no AED as home regimen


-pending COVID for workup








Prob tension HA


- tylenol prn








COVID exposure 


- pt reports 5 coworkers that tested postive for COVID


- COVID test ordered








Hypertension 


- continue imdur








GERD


- continue protonix





COVID result - neg


LINQ monitor-  cardiac workup,


-  MRI and EEG  per neuro - ordered it. .

## 2020-07-13 RX ADMIN — NITROGLYCERIN PRN MG: 0.4 TABLET SUBLINGUAL at 21:25

## 2020-07-13 RX ADMIN — Medication SCH UNITS: at 08:28

## 2020-07-13 RX ADMIN — ASPIRIN 81 MG CHEWABLE TABLET SCH MG: 81 TABLET CHEWABLE at 08:28

## 2020-07-13 NOTE — PDOC.HOSPP
- Subjective


Encounter Date: 07/13/20


Subjective: 





 NEUROLOGY PROGRESS NOTE





Patient alert, awake and denies any acute complaints in the last 24 hours.





- Objective


Vital Signs & Weight: 


 Vital Signs (12 hours)











  Temp Pulse Resp BP BP Pulse Ox


 


 07/13/20 12:48  98.6 F  70  16   115/72  100


 


 07/13/20 08:23  98.2 F  69  15   108/65  99


 


 07/13/20 03:25  97.6 F  64  18  105/67   98








 Weight











Weight                         174 lb














I&O: 


 











 07/12/20 07/13/20 07/14/20





 06:59 06:59 06:59


 


Intake Total 1520 490 


 


Balance 1520 490 











Result Diagrams: 


 07/10/20 10:12





 07/10/20 10:12


Radiology Reviewed by me: Yes


EKG Reviewed by me: Yes





Hospitalist ROS





- Review of Systems


Constitutional: denies: fever, chills, sweats, weakness, malaise, other


Eyes: denies: pain, vision change, conjunctivae inflammation, eyelid 

inflammation, redness, other


ENT: denies: ear pain, ear discharge, nose pain, nose discharge, nose congestion

, mouth pain, mouth swelling, throat pain, throat swelling, other


Respiratory: denies: cough, dry, shortness of breath, hemoptysis, SOB with 

excertion, pleuritic pain, sputum, wheezing, other


Cardiovascular: denies: chest pain, palpitations, orthopnea, paroxysmal noc. 

dyspnea, edema, light headedness, other


Gastrointestinal: denies: nausea, vomiting, abdominal pain, diarrhea, 

constipation, melena, hematochezia, other


Genitourinary: denies: dysuria, frequency, incontinence, hematuria, retention, 

other


Musculoskeletal: denies: neck pain, shoulder pain, arm pain, back pain, hand 

pain, leg pain, foot pain, other


Skin: denies: rash, lesions, geraldine, bruising, other


Neurological: reports: seizures.  denies: weakness, numbness, incoordination, 

change in speech, confusion, other





- Medication


Medications: 


Active Medications











Generic Name Dose Route Start Last Admin





  Trade Name Freq  PRN Reason Stop Dose Admin


 


Acetaminophen  650 mg  07/11/20 14:54  07/11/20 16:02





  Tylenol  PO   650 mg





  Q6H PRN   Administration





  Headache/Fever or Pain   





     





     





     


 


Aspirin  81 mg  07/11/20 09:00  07/13/20 08:28





  Aspirin Chewable  PO   81 mg





  QAM ROSI   Administration





     





     





     





     


 


Atorvastatin Calcium  20 mg  07/10/20 21:00  07/12/20 20:08





  Lipitor  PO   20 mg





  HS ROSI   Administration





     





     





     





     


 


Cholecalciferol  1,000 units  07/11/20 09:00  07/13/20 08:28





  Vitamin D3  PO   1,000 units





  DAILY ROSI   Administration





     





     





     





     


 


Ferrous Sulfate  325 mg  07/11/20 09:00  07/13/20 08:29





  Feosol  PO   325 mg





  DAILY ROSI   Administration





     





     





     





     


 


Isosorbide Mononitrate  30 mg  07/13/20 09:00  07/13/20 08:29





  Imdur Er  PO   30 mg





  DAILY ROSI   Administration





     





     





     





     


 


Levetiracetam  500 mg  07/13/20 11:30  07/13/20 12:42





  Keppra  PO  07/13/20 13:30  500 mg





  NOW ROSI   Administration





     





     





     





     


 


Nitroglycerin  0.4 mg  07/10/20 17:30  07/11/20 23:15





  Nitrostat  SL   0.4 mg





  Q5MIN PRN   Administration





  Chest Pain   





     





     





     


 


Pantoprazole Sodium  40 mg  07/11/20 09:00  07/13/20 08:29





  Protonix  PO   40 mg





  DAILY ROSI   Administration





     





     





     





     














Hosp A/P


(1) Seizure


Code(s): R56.9 - UNSPECIFIED CONVULSIONS   Status: Acute   





(2) Syncope


Code(s): R55 - SYNCOPE AND COLLAPSE   Status: Acute   





(3) Asthma


Code(s): J45.909 - UNSPECIFIED ASTHMA, UNCOMPLICATED   Status: Chronic   





(4) Dyslipidemia


Code(s): E78.5 - HYPERLIPIDEMIA, UNSPECIFIED   Status: Chronic   





(5) Microcytic anemia


Code(s): D50.9 - IRON DEFICIENCY ANEMIA, UNSPECIFIED   Status: Chronic   





- Plan


PT/OT, out of bed/ambulate





50 year old female consulted for black out spells. Prior history of seizure 

disorder and was on dilantin for a year. She wants to be back on 

anticonvulsants because of recurrent spells.





MRI brain pending.


EEG reviewed and was negative for seizure activity. Negative EEG does not rule 

out epilepsy.


Observe seizure precautions.


Ativan 2 mg IV for seizure greater than 2 minutes.


Will give her a trial of Keppra to see if it helps with spells of altered 

awareness.


Start Keppra 500 mg twice daily.


Follow up in outpatient neurology clinic in 2 months.


Plan discussed with the patient and the primary attending Dr. Leal.

## 2020-07-13 NOTE — PDOC.HOSPP
- Subjective


Encounter Date: 07/13/20


Encounter Time: 16:00


Subjective: 





pt sitting in the room moved to [facility]. eeg and MRI pending, 





- Objective


Vital Signs & Weight: 


 Vital Signs (12 hours)











  Temp Pulse Resp BP BP Pulse Ox


 


 07/13/20 13:00  98.2 F  68  17   120/80  99


 


 07/13/20 12:48  98.6 F  70  16  115/72   100


 


 07/13/20 08:23  98.2 F  69  15  108/65   99








 Weight











Weight                         174 lb














I&O: 


 











 07/12/20 07/13/20 07/14/20





 06:59 06:59 06:59


 


Intake Total 1520 490 


 


Balance 1520 490 











Result Diagrams: 


 07/10/20 10:12





 07/10/20 10:12





Hospitalist ROS





- Medication


Medications: 


Active Medications











Generic Name Dose Route Start Last Admin





  Trade Name Freq  PRN Reason Stop Dose Admin


 


Acetaminophen  650 mg  07/11/20 14:54  07/11/20 16:02





  Tylenol  PO   650 mg





  Q6H PRN   Administration





  Headache/Fever or Pain   





     





     





     


 


Aspirin  81 mg  07/11/20 09:00  07/13/20 08:28





  Aspirin Chewable  PO   81 mg





  QAM ROSI   Administration





     





     





     





     


 


Atorvastatin Calcium  20 mg  07/10/20 21:00  07/12/20 20:08





  Lipitor  PO   20 mg





  HS ROSI   Administration





     





     





     





     


 


Cholecalciferol  1,000 units  07/11/20 09:00  07/13/20 08:28





  Vitamin D3  PO   1,000 units





  DAILY ROSI   Administration





     





     





     





     


 


Ferrous Sulfate  325 mg  07/11/20 09:00  07/13/20 08:29





  Feosol  PO   325 mg





  DAILY ROSI   Administration





     





     





     





     


 


Isosorbide Mononitrate  30 mg  07/13/20 09:00  07/13/20 08:29





  Imdur Er  PO   30 mg





  DAILY ROSI   Administration





     





     





     





     


 


Nitroglycerin  0.4 mg  07/10/20 17:30  07/11/20 23:15





  Nitrostat  SL   0.4 mg





  Q5MIN PRN   Administration





  Chest Pain   





     





     





     


 


Pantoprazole Sodium  40 mg  07/11/20 09:00  07/13/20 08:29





  Protonix  PO   40 mg





  DAILY ROSI   Administration





     





     





     





     














- Exam


General Appearance: NAD, awake alert


Eye: PERRL


ENT: normocephalic atraumatic


Neck: supple


Heart: RRR, normal peripheral pulses


Respiratory: CTAB, normal chest expansion


Gastrointestinal: soft, normal bowel sounds


Neurological: no focal deficits





Hosp A/P





- Plan














#Syncope with prodromal sxs of palipitations and weakness


- syncope possibly could be secondary to an arrhythmia given flutter sensations 

in her chest. Alternatively she could have seizure given that she has rigidity 

and postictal confusion during her episodes and has had seizures in the past


- she will be monitored on telemetry





Ayptical CP


- -ruled out  for ACS; nl EKG


-asa, lipitor and imdur - home regimen


-cath in 2011- no obstruction.


-cariology will follow up after COVID r/o





hx of seizure


-no AED as home regimen


-pending COVID for workup








Prob tension HA


- tylenol prn








COVID exposure 


- pt reports 5 coworkers that tested postive for COVID


- COVID test ordered








Hypertension 


- continue imdur








GERD


- continue protonix





COVID result - neg


LINQ monitor-  cardiac workup,


-  MRI and EEG  --pending results





home in am w.. keppra bid.

## 2020-07-13 NOTE — PRG
DATE OF SERVICE:  07/13/2020



SUBJECTIVE:  Ms. Saucedo is doing better.  She has not had any recurrent episodes

of syncope or presyncope.  She has not had true syncope present.  She does have a

previous history of seizure disorder and was placed on antiseizure medication in

2011.  She has subsequently been taken off.  She feels like her symptoms are related

to seizures. 



OBJECTIVE:  VITAL SIGNS:  Blood pressure 108/65, pulse 69, temperature 98.2. 

LUNGS:  Clear to auscultation. 

HEART:  Regular rate and rhythm. 

ABDOMEN:  Soft, nontender, nondistended. 

EXTREMITIES:  No edema.



IMPRESSION:  

1. Presyncope.

2. Musculoskeletal chest pain.



RECOMMENDATIONS:  Given previous history of a seizure disorder and no true syncope,

her symptoms may likely be neurologic in nature.  EEG is currently pending. 



She has had a normal stress study noted in the office.  Otherwise, from my

standpoint, I have no further recommendations.  Plan is to follow up with Ms. Saucedo this week in the office.  I have no further recommendations.  Given that

she is now chest pain-free, no further episodes of syncope and a recent normal

stress study.  I did briefly discuss cholangiography with Ms. Saucedo given her

chest pain and abnormal stress study.  She has deferred and does not feel this is

cardiac in nature and is pleased with a normal stress study.  She has otherwise been

hematologically stable. 







Job ID:  [id number]

## 2020-07-13 NOTE — EEG
DATE OF SERVICE:  07/13/2020



ATTENDING PHYSICIAN:  Cari Sheldon MD 



This EEG was performed using 24-channel [employer]umite[employer]tek video digital EEG machine with 24-disk

electrodes.  This was an extended 2 hours 4 minutes of inpatient video EEG

recording.  Digital analysis of the EEG was done for spike and seizure detection,

which revealed no abnormalities. 



BACKGROUND:  The posterior background rhythm is 9-10 hertz.  The background rhythm

attenuates with eye opening and enhances with eye closure. 



HYPERVENTILATION:  Not performed.



PHOTIC STIMULATION:  Bioccipital symmetric driving responses observed.



SLEEP:  Drowsiness and stage 2 sleep are observed.



EEG DIAGNOSIS:  Normal awake, drowsy, and sleep EEG.







Job ID:  [id number]

## 2020-07-13 NOTE — MRI
MRI BRAIN WITH AND WITHOUT IV CONTRAST:

 

Date:  07/13/2020

 

HISTORY:  

CVA, altered mental status. Confusion. Headache. 

 

COMPARISON:  

03/15/2011. 

 

CORRELATION:

CT brain of 03/25/2019. 

 

FINDINGS:

No evidence of infarct, hemorrhage, mass, midline shift, or abnormal extra-axial fluid collections se
en. The ventricular size is normal and the basilar cisterns are patent. No abnormal postcontrast enha
ncement or restricted diffusion is seen. There is mucosal disease in the paranasal sinuses. 

 

IMPRESSION: 

1.  Normal MRI of the brain. 

2.  Paranasal sinus disease. 

 

RECOMMENDAION: CT scan of the sinuses would be helpful. 

 

 

POS: [facility]

## 2020-07-14 VITALS — DIASTOLIC BLOOD PRESSURE: 68 MMHG | SYSTOLIC BLOOD PRESSURE: 107 MMHG

## 2020-07-14 VITALS — TEMPERATURE: 97.9 F

## 2020-07-14 RX ADMIN — ASPIRIN 81 MG CHEWABLE TABLET SCH MG: 81 TABLET CHEWABLE at 08:22

## 2020-07-14 RX ADMIN — Medication SCH UNITS: at 08:22

## 2020-07-14 NOTE — DIS
DATE OF ADMISSION:  07/10/2020



DATE OF DISCHARGE:  07/14/2020



DISCHARGE DIAGNOSES:  

1. Syncope with prodromal symptoms, palpitations, and weakness.

2. Atypical chest pain, ruled out acute coronary syndrome and normal EKG and 
cath in

2011 without any obstruction. 

3. History of seizure.  Started on Keppra during this hospitalization as she was

dropped the antiepileptics in the past. 

4. Probable tension headache.

5. COVID exposure at work place and COVID tested negative.

6. Gastroesophageal reflux disease.

7. Hypertension.



DISCHARGE MEDICATIONS:  She will continue with her home medications.  The only 
new

addition is Keppra 500 mg twice a day. 



PHYSICAL EXAMINATION:

VITAL SIGNS:  On the day of discharge, temperature 97.9, pulse 62, blood 
pressure

107/68, and saturating 99% on room air. 

GENERAL:  The patient is alert, oriented.  She is comfortable, talking to 
friends

over the phone.  Discharge plan discussed and she is agreeable. 

CARDIOVASCULAR:  Regular rate and rhythm without murmurs, rubs, or gallops. 

LUNGS:  Clear to auscultation bilaterally without wheezing, rales, or rhonchi. 

ABDOMEN:  Soft, nontender, nondistended.  Good bowel sounds. 

EXTREMITIES:  Without any pitting edema or rash.



IMAGING STUDIES:  Brain MRA is negative for any CNS abnormality.  She has some

paranasal sinus disease. 



Electroencephalogram is negative for any epileptiform discharges.



CONSULTS:  

1. Cardiology with Dr. Ngo.

2. Neurologist, Dr. Sheldon.



HOSPITAL COURSE:  Please refer to history and physical and daily progress note 
for

more details.  Briefly, a 50-year-old female with a history of recurrent 
syncope and

seizure, presented with chest pain.  It turned out to be atypical chest pain, 
ruled

out for acute coronary syndrome. 



Regarding her presyncope/syncope, she had recent normal stress test done in the

outpatient clinic at Dr. Saucedo's office.  Cardiology evaluated her and 
decided

with a normal stress test.  She does not need any further cardiac workup.

Cholangiography would be appropriate given her atypical chest symptoms.

However, the patient deferred that option. 



Regarding her seizure episodes in the past, there was concern whether syncope 
and

seizure are related.  Imaging studies appeared negative . She 

had some recurrent blackout spells.  Because of that, she was restarted back on

Keppra 500 mg b.i.d.  EEG was negative for seizure activity but epilepsy 

is not ruled out completely.  She will follow up with Dr. Hendricks in 2 to 4

weeks' time. 



She is stable to be discharged home today.



DISCHARGE INSTRUCTIONS:  Activity as tolerated.  I did talk to her about seizure

precautions including driving.  The patient states that her  is a retired

person, and he would be able to drive her back and forth, which is 20 minutes 
drive

to her work. 



With this counseling and workup, she is ready to be discharged. 



Follow up with the PCP in 1 week. 



Discharge time took over 35 minutes.







Job ID:  [id number]



MTDD

## 2020-07-14 NOTE — PDOC.HOSPP
- Subjective


Encounter Date: 07/14/20


Subjective: 





 NEUROLOGY PROGRESS NOTE





Patient alert, awake and denies any acute complaints in the last 24 

hours.Keppra started yesterday which she tolerated well. 





- Objective


Vital Signs & Weight: 


 Vital Signs (12 hours)











  Temp Pulse Resp BP Pulse Ox


 


 07/14/20 08:17  97.9 F  65  12  107/69  99


 


 07/14/20 03:50  97.8 F  58 L  16  108/63  98








 Weight











Weight                         174 lb














I&O: 


 











 07/13/20 07/14/20 07/15/20





 06:59 06:59 06:59


 


Intake Total 490 630 


 


Balance 490 630 











Result Diagrams: 


 07/10/20 10:12





 07/10/20 10:12


Radiology Reviewed by me: Yes


EKG Reviewed by me: Yes





Hospitalist ROS





- Review of Systems


Constitutional: denies: fever, chills, sweats, weakness, malaise, other


Eyes: denies: pain, vision change, conjunctivae inflammation, eyelid 

inflammation, redness, other


ENT: denies: ear pain, ear discharge, nose pain, nose discharge, nose congestion

, mouth pain, mouth swelling, throat pain, throat swelling, other


Respiratory: denies: cough, dry, shortness of breath, hemoptysis, SOB with 

excertion, pleuritic pain, sputum, wheezing, other


Cardiovascular: denies: chest pain, palpitations, orthopnea, paroxysmal noc. 

dyspnea, edema, light headedness, other


Gastrointestinal: denies: nausea, vomiting, abdominal pain, diarrhea, 

constipation, melena, hematochezia, other


Genitourinary: denies: dysuria, frequency, incontinence, hematuria, retention, 

other


Musculoskeletal: denies: neck pain, shoulder pain, arm pain, back pain, hand 

pain, leg pain, foot pain, other


Skin: denies: rash, lesions, geraldine, bruising, other





- Medication


Medications: 


Active Medications











Generic Name Dose Route Start Last Admin





  Trade Name Freq  PRN Reason Stop Dose Admin


 


Acetaminophen  650 mg  07/11/20 14:54  07/11/20 16:02





  Tylenol  PO   650 mg





  Q6H PRN   Administration





  Headache/Fever or Pain   





     





     





     


 


Aspirin  81 mg  07/11/20 09:00  07/14/20 08:22





  Aspirin Chewable  PO   81 mg





  QAM ROSI   Administration





     





     





     





     


 


Atorvastatin Calcium  20 mg  07/10/20 21:00  07/13/20 21:25





  Lipitor  PO   20 mg





  HS ROSI   Administration





     





     





     





     


 


Cholecalciferol  1,000 units  07/11/20 09:00  07/14/20 08:22





  Vitamin D3  PO   1,000 units





  DAILY ROSI   Administration





     





     





     





     


 


Ferrous Sulfate  325 mg  07/11/20 09:00  07/14/20 08:21





  Feosol  PO   325 mg





  DAILY ROSI   Administration





     





     





     





     


 


Isosorbide Mononitrate  30 mg  07/13/20 09:00  07/14/20 08:22





  Imdur Er  PO   30 mg





  DAILY ROSI   Administration





     





     





     





     


 


Levetiracetam  500 mg  07/13/20 21:00  07/14/20 08:21





  Keppra  PO   500 mg





  BID ROSI   Administration





     





     





     





     


 


Nitroglycerin  0.4 mg  07/10/20 17:30  07/13/20 21:25





  Nitrostat  SL   0.4 mg





  Q5MIN PRN   Administration





  Chest Pain   





     





     





     


 


Pantoprazole Sodium  40 mg  07/11/20 09:00  07/14/20 08:22





  Protonix  PO   40 mg





  DAILY ROSI   Administration





     





     





     





     














- Exam


General Appearance: awake alert


Eye: PERRL


ENT: normocephalic atraumatic, no oropharyngeal lesions


Neck: supple


Heart: RRR


Respiratory: CTAB


Gastrointestinal: soft


Extremities: no cyanosis


Skin: normal turgor


Neurological: cranial nerve grossly intact


Musculoskeletal: normal tone


Psychiatric: normal affect, normal behavior, A&O x 3, oriented to person, 

oriented to place, oriented to time





Hosp A/P


(1) Seizure


Code(s): R56.9 - UNSPECIFIED CONVULSIONS   Status: Acute   





(2) Syncope


Code(s): R55 - SYNCOPE AND COLLAPSE   Status: Acute   





(3) Asthma


Code(s): J45.909 - UNSPECIFIED ASTHMA, UNCOMPLICATED   Status: Chronic   





(4) Dyslipidemia


Code(s): E78.5 - HYPERLIPIDEMIA, UNSPECIFIED   Status: Chronic   





(5) Microcytic anemia


Code(s): D50.9 - IRON DEFICIENCY ANEMIA, UNSPECIFIED   Status: Chronic   





- Plan





50 year old female consulted for black out spells. Prior history of seizure 

disorder and was on dilantin for a year. She wanted to be back on 

anticonvulsants because of recurrent spells. Keppra started yesterday which she 

tolerated well without any side-effects.





MRI brain reviewed which was negative for acute process.


EEG reviewed and was negative for seizure activity. Negative EEG does not rule 

out epilepsy.


Observe seizure precautions.


Ativan 2 mg IV for seizure greater than 2 minutes.


Trial of Keppra started yesterday to see if it helps with spells of altered 

awareness.She tolerated the keppra well.


Continue Keppra 500 mg twice daily.


Follow up in outpatient neurology clinic in 2 months.


Plan discussed with the patient and the primary attending Dr. Leal.

## 2021-01-07 ENCOUNTER — HOSPITAL ENCOUNTER (EMERGENCY)
Dept: HOSPITAL 92 - ERS | Age: 51
LOS: 1 days | Discharge: HOME | End: 2021-01-08
Payer: COMMERCIAL

## 2021-01-07 DIAGNOSIS — R51.9: Primary | ICD-10-CM

## 2021-01-07 DIAGNOSIS — R00.2: ICD-10-CM

## 2021-01-07 DIAGNOSIS — Z79.51: ICD-10-CM

## 2021-01-07 DIAGNOSIS — Z79.899: ICD-10-CM

## 2021-01-07 DIAGNOSIS — R07.9: ICD-10-CM

## 2021-01-07 DIAGNOSIS — J45.909: ICD-10-CM

## 2021-01-07 DIAGNOSIS — E78.5: ICD-10-CM

## 2021-01-07 DIAGNOSIS — I10: ICD-10-CM

## 2021-01-07 DIAGNOSIS — Z79.82: ICD-10-CM

## 2021-01-07 DIAGNOSIS — E78.00: ICD-10-CM

## 2021-01-07 PROCEDURE — 93005 ELECTROCARDIOGRAM TRACING: CPT

## 2021-01-07 PROCEDURE — 84484 ASSAY OF TROPONIN QUANT: CPT

## 2021-01-07 PROCEDURE — 83880 ASSAY OF NATRIURETIC PEPTIDE: CPT

## 2021-01-07 PROCEDURE — 96375 TX/PRO/DX INJ NEW DRUG ADDON: CPT

## 2021-01-07 PROCEDURE — 85025 COMPLETE CBC W/AUTO DIFF WBC: CPT

## 2021-01-07 PROCEDURE — 85379 FIBRIN DEGRADATION QUANT: CPT

## 2021-01-07 PROCEDURE — 71045 X-RAY EXAM CHEST 1 VIEW: CPT

## 2021-01-07 PROCEDURE — 80053 COMPREHEN METABOLIC PANEL: CPT

## 2021-01-07 PROCEDURE — 84443 ASSAY THYROID STIM HORMONE: CPT

## 2021-01-07 PROCEDURE — 96374 THER/PROPH/DIAG INJ IV PUSH: CPT

## 2021-01-08 LAB
ALBUMIN SERPL BCG-MCNC: 4.3 G/DL (ref 3.5–5)
ALP SERPL-CCNC: 85 U/L (ref 40–110)
ALT SERPL W P-5'-P-CCNC: 24 U/L (ref 8–55)
ANION GAP SERPL CALC-SCNC: 15 MMOL/L (ref 10–20)
AST SERPL-CCNC: 26 U/L (ref 5–34)
BASOPHILS # BLD AUTO: 0.1 THOU/UL (ref 0–0.2)
BASOPHILS NFR BLD AUTO: 0.6 % (ref 0–1)
BILIRUB SERPL-MCNC: 0.4 MG/DL (ref 0.2–1.2)
BUN SERPL-MCNC: 10 MG/DL (ref 9.8–20.1)
CALCIUM SERPL-MCNC: 9.7 MG/DL (ref 7.8–10.44)
CHLORIDE SERPL-SCNC: 103 MMOL/L (ref 98–107)
CO2 SERPL-SCNC: 27 MMOL/L (ref 22–29)
CREAT CL PREDICTED SERPL C-G-VRATE: 0 ML/MIN (ref 70–130)
EOSINOPHIL # BLD AUTO: 0.3 THOU/UL (ref 0–0.7)
EOSINOPHIL NFR BLD AUTO: 3.3 % (ref 0–10)
GLOBULIN SER CALC-MCNC: 3.7 G/DL (ref 2.4–3.5)
GLUCOSE SERPL-MCNC: 105 MG/DL (ref 70–105)
HGB BLD-MCNC: 12.5 G/DL (ref 12–16)
LYMPHOCYTES # BLD: 3.4 THOU/UL (ref 1.2–3.4)
LYMPHOCYTES NFR BLD AUTO: 39.2 % (ref 21–51)
MCH RBC QN AUTO: 23.9 PG (ref 27–31)
MCV RBC AUTO: 72.8 FL (ref 78–98)
MONOCYTES # BLD AUTO: 0.6 THOU/UL (ref 0.11–0.59)
MONOCYTES NFR BLD AUTO: 6.4 % (ref 0–10)
NEUTROPHILS # BLD AUTO: 4.4 THOU/UL (ref 1.4–6.5)
NEUTROPHILS NFR BLD AUTO: 50.4 % (ref 42–75)
PLATELET # BLD AUTO: 287 THOU/UL (ref 130–400)
POTASSIUM SERPL-SCNC: 4.4 MMOL/L (ref 3.5–5.1)
RBC # BLD AUTO: 5.22 MILL/UL (ref 4.2–5.4)
SODIUM SERPL-SCNC: 141 MMOL/L (ref 136–145)
WBC # BLD AUTO: 8.7 THOU/UL (ref 4.8–10.8)

## 2021-01-08 NOTE — RAD
XR Chest 1 View Portable



HISTORY: Chest pain



COMPARISON: 7/10/2020



FINDINGS: The heart size is normal. The lungs are without focal areas of consolidation, pneumothorax 
or pleural effusions.



IMPRESSION: No radiographic evidence of acute cardiopulmonary process.



Reported By: Lars Gaona 

Electronically Signed:  1/8/2021 7:55 AM

## 2021-02-24 ENCOUNTER — HOSPITAL ENCOUNTER (OUTPATIENT)
Dept: HOSPITAL 92 - BICMAMMO | Age: 51
Discharge: HOME | End: 2021-02-24
Attending: NURSE PRACTITIONER
Payer: COMMERCIAL

## 2021-02-24 DIAGNOSIS — Z80.3: ICD-10-CM

## 2021-02-24 DIAGNOSIS — Z12.31: Primary | ICD-10-CM

## 2021-02-24 PROCEDURE — 77067 SCR MAMMO BI INCL CAD: CPT

## 2021-02-24 PROCEDURE — 77063 BREAST TOMOSYNTHESIS BI: CPT

## 2021-02-24 NOTE — MMO
Bilateral MAMMO Bilat Screen [facility].

 

CLINICAL HISTORY:

Patient is 51 years old and is seen for screening. The patient has the following

family history of breast cancer:  maternal aunt.  The patient has no personal

history of cancer. The patient has a history of Stereotatic Biopsy in 2018.

 

VIEWS:

The views performed were:  bilateral craniocaudal with tomosynthesis and

bilateral mediolateral oblique with tomosynthesis.

 

FILMS COMPARED:

The present examination has been compared to prior imaging studies performed at

[facility] on 12/20/2016, 01/10/2018, 01/22/2018 and 02/14/2019.

 

This study has been interpreted with the assistance of computer-aided detection.

 

MAMMOGRAM FINDINGS:

There are scattered fibroglandular densities.

 

There are no suspicious masses, suspicious calcifications, or new areas of

architectural distortion.

 

IMPRESSION:

THERE IS NO MAMMOGRAPHIC EVIDENCE OF MALIGNANCY.

 

A ROUTINE FOLLOW-UP MAMMOGRAM IN 1 YEAR IS RECOMMENDED.

 

THE RESULTS OF THIS EXAM WERE SENT TO THE PATIENT.

 

ACR BI-RADS Category 1 - Negative

 

MAMMOGRAPHY NOTE:

 1. A negative mammogram report should not delay a biopsy if a dominant of

 clinically suspicious mass is present.

 2. Approximately 10% to 15% of breast cancers are not detected by

 mammography.

 3. Adenosis and dense breasts may obscure an underlying neoplasm.

 

 

Reported by: MARGARET ESCOBEDO MD

Electonically Signed: [id number]

## 2024-02-16 NOTE — DIS
DATE OF ADMISSION:  08/30/2018

 

DATE OF DISCHARGE:  08/31/2018

 

PRIMARY CARE PHYSICIAN:  Kailey Montaño N.P.

 

DISCHARGE DISPOSITION:  Home.

 

PRIMARY DISCHARGE DIAGNOSIS:  Syncope, unexplained, unwitnessed.

 

SECONDARY DISCHARGE DIAGNOSES:  Mild intermittent asthma, dyslipidemia, microcytic anemia, dyslipidem
ia, obesity with body mass index 30.

 

PRIMARY PROCEDURE/OPERATION:  None.

 

RADIOLOGICAL INVESTIGATION:  Carotid Doppler normal.  CT brain normal.  Chest x-ray unremarkable.

 

SIGNIFICANT LABORATORIES:  WBC 7.2, hemoglobin 10.3, platelet 228.  Sodium 136, potassium 3.9, BUN 8,
 creatinine 0.71, calcium 8.4.  Cardiac enzymes negative x3.  LFT normal.  , albumin 4.0, BNP 1
1.8.  Urinalysis:  RBC greater than 50 and blood large.

 

TEST RESULTS PENDING ON DISCHARGE:  Echocardiography.

 

DISCHARGE PLAN:  Post hospital, patient will follow up with primary care physician in 1 week.

 

HOSPITAL COURSE:  A 48-year-old -American female who was admitted by Dr. Bell.  Please see
 his H&P for further detail.  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 
out.  Nobody witnessed and the patient did not have any cardiac symptoms before or after episode.  Th
ere was no seizure type of activity per patient.  Patient was brought to ER and her electrocardiogram
 was normal.  Her chest x-ray was normal.  Her CT brain was negative.  Patient was admitted to teleme
try unit for observation.  Carotid Doppler came back normal.  Echocardiography done, result is pendin
g.  We are going to check orthostatic vitals to rule out orthostatic hypotension.  At this point, her
 syncopal episode is unclear and unexplained.  Her telemetry remained unremarkable.  Once all investi
gation came back negative, then we will consider discharging her home because she is feeling perfectl
y normal and expressed wish to go home today.  I advised her to take a low dose of aspirin as well as
 iron supplement.  The patient is also advised to keep herself hydrated, given her elevated total CK.
  Her cardiac enzymes remain negative.  Her microscopic hematuria was related with elevated total CK.
  Her chest x-ray showed some congestion, but it was artifact; clinically patient does not appear to 
be in congested.

 

The patient is seen and examined at bedside today.  Plan of care discussed with the family member.

 

PHYSICAL EXAMINATION:

VITAL SIGNS:  Currently, temperature 98.5, pulse 68, respiratory rate 16, saturation 94% on room air,
 blood pressure 112/72, weight 167 pounds.

GENERAL:  The patient is currently alert, awake, no obvious acute distress.

HEAD:  Normocephalic, atraumatic.

EYES:  Pupils round, reactive to light.  Extraocular muscle intact.

ENT:  Oropharynx within normal limits.  Moist mucous membranes.  No oral lesion, no pharyngeal erythe
ma, no exudate.

NECK:  Supple, no JVD, no thyromegaly, no carotid bruit.  No jugular venous distention.

LUNGS:  Clear to auscultation without any rhonchi or rales.

CARDIAC:  S1 and S2 regular without any murmur, no gallop, no rub.

ABDOMEN:  Soft, bowel sounds present, nontender, nondistended.  No organomegaly, no mass, no suprapub
ic tenderness.

BACK:  Examination unremarkable.  No CVA tenderness.

EXTREMITIES:  Upper extremity range of motion is normal.  Lower extremity, no edema.  Good distal pul
sation, no calf tenderness.

NEUROLOGIC:  Nonfocal examination.

 

The patient is medically stable for discharge later on today.
denies pain/discomfort (Rating = 0)